# Patient Record
Sex: MALE | Race: WHITE | Employment: OTHER | URBAN - METROPOLITAN AREA
[De-identification: names, ages, dates, MRNs, and addresses within clinical notes are randomized per-mention and may not be internally consistent; named-entity substitution may affect disease eponyms.]

---

## 2017-02-07 ENCOUNTER — TELEPHONE (OUTPATIENT)
Dept: FAMILY MEDICINE CLINIC | Facility: CLINIC | Age: 82
End: 2017-02-07

## 2017-02-07 DIAGNOSIS — E78.00 PURE HYPERCHOLESTEROLEMIA: Primary | ICD-10-CM

## 2017-02-07 NOTE — TELEPHONE ENCOUNTER
Per pt, he needs a refill of his med but would like to know if he needs to have and Order to do, if so pt would like to have his blood work first.

## 2017-02-08 NOTE — TELEPHONE ENCOUNTER
Dr. Geanie Cooks,   see refill encounter as well, pt would like to know if he will need any lab orders done before refills are given. Please advise. Thanks.

## 2017-02-09 RX ORDER — LISINOPRIL 20 MG/1
TABLET ORAL
Qty: 90 TABLET | Refills: 1 | Status: SHIPPED | OUTPATIENT
Start: 2017-02-09 | End: 2017-02-24

## 2017-02-09 RX ORDER — LOVASTATIN 20 MG/1
TABLET ORAL
Qty: 90 TABLET | Refills: 1 | Status: SHIPPED | OUTPATIENT
Start: 2017-02-09 | End: 2017-02-24

## 2017-02-09 RX ORDER — AMLODIPINE BESYLATE 5 MG/1
TABLET ORAL
Qty: 90 TABLET | Refills: 1 | Status: SHIPPED | OUTPATIENT
Start: 2017-02-09 | End: 2017-02-24

## 2017-02-14 ENCOUNTER — APPOINTMENT (OUTPATIENT)
Dept: LAB | Age: 82
End: 2017-02-14
Attending: FAMILY MEDICINE
Payer: MEDICARE

## 2017-02-14 DIAGNOSIS — E78.00 PURE HYPERCHOLESTEROLEMIA: ICD-10-CM

## 2017-02-14 LAB
ALBUMIN SERPL BCP-MCNC: 3.6 G/DL (ref 3.5–4.8)
ALBUMIN/GLOB SERPL: 1.2 {RATIO} (ref 1–2)
ALP SERPL-CCNC: 47 U/L (ref 32–100)
ALT SERPL-CCNC: 14 U/L (ref 17–63)
ANION GAP SERPL CALC-SCNC: 8 MMOL/L (ref 0–18)
AST SERPL-CCNC: 20 U/L (ref 15–41)
BILIRUB SERPL-MCNC: 0.9 MG/DL (ref 0.3–1.2)
BUN SERPL-MCNC: 17 MG/DL (ref 8–20)
BUN/CREAT SERPL: 16.8 (ref 10–20)
CALCIUM SERPL-MCNC: 8.8 MG/DL (ref 8.5–10.5)
CHLORIDE SERPL-SCNC: 101 MMOL/L (ref 95–110)
CHOLEST SERPL-MCNC: 146 MG/DL (ref 110–200)
CO2 SERPL-SCNC: 29 MMOL/L (ref 22–32)
CREAT SERPL-MCNC: 1.01 MG/DL (ref 0.5–1.5)
GLOBULIN PLAS-MCNC: 2.9 G/DL (ref 2.5–3.7)
GLUCOSE SERPL-MCNC: 98 MG/DL (ref 70–99)
HDLC SERPL-MCNC: 44 MG/DL
LDLC SERPL CALC-MCNC: 89 MG/DL (ref 0–99)
NONHDLC SERPL-MCNC: 102 MG/DL
OSMOLALITY UR CALC.SUM OF ELEC: 288 MOSM/KG (ref 275–295)
POTASSIUM SERPL-SCNC: 4.3 MMOL/L (ref 3.3–5.1)
PROT SERPL-MCNC: 6.5 G/DL (ref 5.9–8.4)
SODIUM SERPL-SCNC: 138 MMOL/L (ref 136–144)
TRIGL SERPL-MCNC: 67 MG/DL (ref 1–149)

## 2017-02-14 PROCEDURE — 80061 LIPID PANEL: CPT

## 2017-02-14 PROCEDURE — 36415 COLL VENOUS BLD VENIPUNCTURE: CPT

## 2017-02-14 PROCEDURE — 80053 COMPREHEN METABOLIC PANEL: CPT

## 2017-02-24 ENCOUNTER — OFFICE VISIT (OUTPATIENT)
Dept: FAMILY MEDICINE CLINIC | Facility: CLINIC | Age: 82
End: 2017-02-24

## 2017-02-24 VITALS
BODY MASS INDEX: 25.77 KG/M2 | HEIGHT: 70 IN | SYSTOLIC BLOOD PRESSURE: 103 MMHG | TEMPERATURE: 99 F | WEIGHT: 180 LBS | DIASTOLIC BLOOD PRESSURE: 67 MMHG | HEART RATE: 71 BPM

## 2017-02-24 DIAGNOSIS — R73.03 PREDIABETES: Primary | ICD-10-CM

## 2017-02-24 DIAGNOSIS — E11.9 DIABETES MELLITUS TYPE 2 IN NONOBESE (HCC): ICD-10-CM

## 2017-02-24 DIAGNOSIS — E78.00 PURE HYPERCHOLESTEROLEMIA: ICD-10-CM

## 2017-02-24 PROCEDURE — G0463 HOSPITAL OUTPT CLINIC VISIT: HCPCS | Performed by: FAMILY MEDICINE

## 2017-02-24 PROCEDURE — 99214 OFFICE O/P EST MOD 30 MIN: CPT | Performed by: FAMILY MEDICINE

## 2017-02-24 RX ORDER — LISINOPRIL 20 MG/1
20 TABLET ORAL
Qty: 90 TABLET | Refills: 1 | Status: SHIPPED | OUTPATIENT
Start: 2017-02-24 | End: 2017-08-23

## 2017-02-24 RX ORDER — LOVASTATIN 20 MG/1
TABLET ORAL
Qty: 90 TABLET | Refills: 1 | Status: SHIPPED | OUTPATIENT
Start: 2017-02-24 | End: 2017-08-23

## 2017-02-24 RX ORDER — AMLODIPINE BESYLATE 5 MG/1
5 TABLET ORAL
Qty: 90 TABLET | Refills: 1 | Status: SHIPPED | OUTPATIENT
Start: 2017-02-24 | End: 2017-08-23

## 2017-02-24 NOTE — PROGRESS NOTES
HPI:    Patient ID: Aileen Teague is a 80year old male. HPI Comments: Diabetes diet controlled. Hypertension  This is a chronic problem. The current episode started more than 1 year ago. The problem is controlled.  Pertinent negatives include no apply Kit  Disp:  Rfl:    Glucose Blood (ACCU-CHEK PHILL) In Vitro Strip  Disp:  Rfl:      Allergies:No Known Allergies   PHYSICAL EXAM:   Physical Exam   Constitutional: He is oriented to person, place, and time.  He appears well-developed and well-nourish

## 2017-08-14 ENCOUNTER — TELEPHONE (OUTPATIENT)
Dept: FAMILY MEDICINE CLINIC | Facility: CLINIC | Age: 82
End: 2017-08-14

## 2017-08-14 NOTE — TELEPHONE ENCOUNTER
Pt has labs available from February, Dr Severo Fruit are any other labs needed? Pt has OV 8/23.  Thanks

## 2017-08-15 NOTE — TELEPHONE ENCOUNTER
Pt notified fasting labs available. Said he needs refills prior to appt but will go through pharmacy.

## 2017-08-17 ENCOUNTER — LAB ENCOUNTER (OUTPATIENT)
Dept: LAB | Age: 82
End: 2017-08-17
Attending: FAMILY MEDICINE
Payer: MEDICARE

## 2017-08-17 DIAGNOSIS — E11.9 DIABETES MELLITUS TYPE 2 IN NONOBESE (HCC): ICD-10-CM

## 2017-08-17 DIAGNOSIS — E78.00 PURE HYPERCHOLESTEROLEMIA: ICD-10-CM

## 2017-08-17 LAB
ALBUMIN SERPL BCP-MCNC: 3.7 G/DL (ref 3.5–4.8)
ALBUMIN/GLOB SERPL: 1.3 {RATIO} (ref 1–2)
ALP SERPL-CCNC: 46 U/L (ref 32–100)
ALT SERPL-CCNC: 15 U/L (ref 17–63)
ANION GAP SERPL CALC-SCNC: 6 MMOL/L (ref 0–18)
AST SERPL-CCNC: 19 U/L (ref 15–41)
BASOPHILS # BLD: 0.1 K/UL (ref 0–0.2)
BASOPHILS NFR BLD: 1 %
BILIRUB SERPL-MCNC: 0.6 MG/DL (ref 0.3–1.2)
BUN SERPL-MCNC: 17 MG/DL (ref 8–20)
BUN/CREAT SERPL: 18.1 (ref 10–20)
CALCIUM SERPL-MCNC: 8.7 MG/DL (ref 8.5–10.5)
CHLORIDE SERPL-SCNC: 106 MMOL/L (ref 95–110)
CHOLEST SERPL-MCNC: 149 MG/DL (ref 110–200)
CO2 SERPL-SCNC: 26 MMOL/L (ref 22–32)
CREAT SERPL-MCNC: 0.94 MG/DL (ref 0.5–1.5)
EOSINOPHIL # BLD: 0.1 K/UL (ref 0–0.7)
EOSINOPHIL NFR BLD: 2 %
ERYTHROCYTE [DISTWIDTH] IN BLOOD BY AUTOMATED COUNT: 13.8 % (ref 11–15)
GLOBULIN PLAS-MCNC: 2.8 G/DL (ref 2.5–3.7)
GLUCOSE SERPL-MCNC: 99 MG/DL (ref 70–99)
HBA1C MFR BLD: 6 % (ref 4–6)
HCT VFR BLD AUTO: 42.2 % (ref 41–52)
HDLC SERPL-MCNC: 46 MG/DL
HGB BLD-MCNC: 14.3 G/DL (ref 13.5–17.5)
LDLC SERPL CALC-MCNC: 92 MG/DL (ref 0–99)
LYMPHOCYTES # BLD: 2.1 K/UL (ref 1–4)
LYMPHOCYTES NFR BLD: 31 %
MCH RBC QN AUTO: 30.2 PG (ref 27–32)
MCHC RBC AUTO-ENTMCNC: 33.8 G/DL (ref 32–37)
MCV RBC AUTO: 89.3 FL (ref 80–100)
MONOCYTES # BLD: 0.6 K/UL (ref 0–1)
MONOCYTES NFR BLD: 9 %
NEUTROPHILS # BLD AUTO: 3.9 K/UL (ref 1.8–7.7)
NEUTROPHILS NFR BLD: 58 %
NONHDLC SERPL-MCNC: 103 MG/DL
OSMOLALITY UR CALC.SUM OF ELEC: 288 MOSM/KG (ref 275–295)
PLATELET # BLD AUTO: 139 K/UL (ref 140–400)
PMV BLD AUTO: 10.8 FL (ref 7.4–10.3)
POTASSIUM SERPL-SCNC: 4.2 MMOL/L (ref 3.3–5.1)
PROT SERPL-MCNC: 6.5 G/DL (ref 5.9–8.4)
RBC # BLD AUTO: 4.73 M/UL (ref 4.5–5.9)
SODIUM SERPL-SCNC: 138 MMOL/L (ref 136–144)
TRIGL SERPL-MCNC: 54 MG/DL (ref 1–149)
TSH SERPL-ACNC: 0.8 UIU/ML (ref 0.45–5.33)
WBC # BLD AUTO: 6.8 K/UL (ref 4–11)

## 2017-08-17 PROCEDURE — 36415 COLL VENOUS BLD VENIPUNCTURE: CPT

## 2017-08-17 PROCEDURE — 85025 COMPLETE CBC W/AUTO DIFF WBC: CPT

## 2017-08-17 PROCEDURE — 80053 COMPREHEN METABOLIC PANEL: CPT

## 2017-08-17 PROCEDURE — 83036 HEMOGLOBIN GLYCOSYLATED A1C: CPT

## 2017-08-17 PROCEDURE — 84443 ASSAY THYROID STIM HORMONE: CPT

## 2017-08-17 PROCEDURE — 80061 LIPID PANEL: CPT

## 2017-08-23 ENCOUNTER — OFFICE VISIT (OUTPATIENT)
Dept: FAMILY MEDICINE CLINIC | Facility: CLINIC | Age: 82
End: 2017-08-23

## 2017-08-23 VITALS
WEIGHT: 180 LBS | BODY MASS INDEX: 25.77 KG/M2 | TEMPERATURE: 98 F | SYSTOLIC BLOOD PRESSURE: 107 MMHG | HEART RATE: 80 BPM | HEIGHT: 70 IN | DIASTOLIC BLOOD PRESSURE: 66 MMHG

## 2017-08-23 DIAGNOSIS — E11.9 DIABETES MELLITUS TYPE 2 IN NONOBESE (HCC): Primary | ICD-10-CM

## 2017-08-23 PROCEDURE — G0463 HOSPITAL OUTPT CLINIC VISIT: HCPCS | Performed by: FAMILY MEDICINE

## 2017-08-23 PROCEDURE — 99213 OFFICE O/P EST LOW 20 MIN: CPT | Performed by: FAMILY MEDICINE

## 2017-08-23 RX ORDER — LISINOPRIL 20 MG/1
20 TABLET ORAL
Qty: 90 TABLET | Refills: 1 | Status: SHIPPED | OUTPATIENT
Start: 2017-08-23 | End: 2018-04-12

## 2017-08-23 RX ORDER — LOVASTATIN 20 MG/1
TABLET ORAL
Qty: 90 TABLET | Refills: 1 | Status: SHIPPED | OUTPATIENT
Start: 2017-08-23 | End: 2018-04-05

## 2017-08-23 RX ORDER — ASPIRIN 81 MG/1
81 TABLET ORAL DAILY
Qty: 90 TABLET | Refills: 3 | Status: SHIPPED | OUTPATIENT
Start: 2017-08-23 | End: 2020-04-29

## 2017-08-23 RX ORDER — AMLODIPINE BESYLATE 5 MG/1
5 TABLET ORAL
Qty: 90 TABLET | Refills: 1 | Status: SHIPPED | OUTPATIENT
Start: 2017-08-23 | End: 2018-04-12

## 2017-08-24 NOTE — PROGRESS NOTES
HPI:    Patient ID: Agus Brown is a [de-identified]year old male. Diabetes mellitus controlled. Patient doing well. No problems. Walks daily and is active      Diabetes   Pertinent negatives for diabetes include no chest pain, no fatigue and no weakness. and time. He has normal reflexes. Skin: Skin is warm and dry.               ASSESSMENT/PLAN:   Diabetes mellitus type 2 in nonobese (hcc)  (primary encounter diagnosis)  dieti controlled  Hypertension controlled  cpm  F/u in 6 months  No orders of the def

## 2018-04-02 ENCOUNTER — TELEPHONE (OUTPATIENT)
Dept: FAMILY MEDICINE CLINIC | Facility: CLINIC | Age: 83
End: 2018-04-02

## 2018-04-02 DIAGNOSIS — E78.00 PURE HYPERCHOLESTEROLEMIA: Primary | ICD-10-CM

## 2018-04-02 DIAGNOSIS — E11.9 DIABETES MELLITUS TYPE 2 IN NONOBESE (HCC): ICD-10-CM

## 2018-04-04 NOTE — TELEPHONE ENCOUNTER
Patient informed lab orders placed verbalized understanding. Appointment with Dr. Terra Lopez 4/12/18 confirmed.

## 2018-04-05 ENCOUNTER — APPOINTMENT (OUTPATIENT)
Dept: LAB | Age: 83
End: 2018-04-05
Attending: FAMILY MEDICINE
Payer: MEDICARE

## 2018-04-05 DIAGNOSIS — E11.9 DIABETES MELLITUS TYPE 2 IN NONOBESE (HCC): ICD-10-CM

## 2018-04-05 DIAGNOSIS — E78.00 PURE HYPERCHOLESTEROLEMIA: ICD-10-CM

## 2018-04-05 PROCEDURE — 80061 LIPID PANEL: CPT

## 2018-04-05 PROCEDURE — 80076 HEPATIC FUNCTION PANEL: CPT

## 2018-04-05 PROCEDURE — 83036 HEMOGLOBIN GLYCOSYLATED A1C: CPT

## 2018-04-05 PROCEDURE — 36415 COLL VENOUS BLD VENIPUNCTURE: CPT

## 2018-04-05 RX ORDER — LOVASTATIN 20 MG/1
TABLET ORAL
Qty: 90 TABLET | Refills: 0 | Status: SHIPPED | OUTPATIENT
Start: 2018-04-05 | End: 2018-04-12

## 2018-04-12 ENCOUNTER — OFFICE VISIT (OUTPATIENT)
Dept: FAMILY MEDICINE CLINIC | Facility: CLINIC | Age: 83
End: 2018-04-12

## 2018-04-12 VITALS
HEIGHT: 70 IN | WEIGHT: 176 LBS | TEMPERATURE: 98 F | BODY MASS INDEX: 25.2 KG/M2 | SYSTOLIC BLOOD PRESSURE: 102 MMHG | DIASTOLIC BLOOD PRESSURE: 67 MMHG | HEART RATE: 79 BPM

## 2018-04-12 DIAGNOSIS — E11.9 DIET-CONTROLLED DIABETES MELLITUS (HCC): ICD-10-CM

## 2018-04-12 DIAGNOSIS — Z00.00 ENCOUNTER FOR ANNUAL HEALTH EXAMINATION: ICD-10-CM

## 2018-04-12 DIAGNOSIS — M47.816 LUMBAR SPONDYLOSIS: ICD-10-CM

## 2018-04-12 DIAGNOSIS — H35.30 MACULAR DEGENERATION, UNSPECIFIED LATERALITY, UNSPECIFIED TYPE: Primary | ICD-10-CM

## 2018-04-12 PROCEDURE — G0439 PPPS, SUBSEQ VISIT: HCPCS | Performed by: FAMILY MEDICINE

## 2018-04-12 RX ORDER — LISINOPRIL 20 MG/1
20 TABLET ORAL
Qty: 90 TABLET | Refills: 1 | Status: SHIPPED | OUTPATIENT
Start: 2018-04-12 | End: 2018-11-14

## 2018-04-12 RX ORDER — CHOLECALCIFEROL (VITAMIN D3) 125 MCG
500 CAPSULE ORAL DAILY
Qty: 90 TABLET | Refills: 3 | Status: SHIPPED | OUTPATIENT
Start: 2018-04-12 | End: 2021-04-15

## 2018-04-12 RX ORDER — LOVASTATIN 20 MG/1
TABLET ORAL
Qty: 90 TABLET | Refills: 1 | Status: SHIPPED | OUTPATIENT
Start: 2018-04-12 | End: 2018-11-14

## 2018-04-12 RX ORDER — FOLIC ACID 1 MG/1
1 TABLET ORAL DAILY
Qty: 90 TABLET | Refills: 3 | Status: SHIPPED | OUTPATIENT
Start: 2018-04-12 | End: 2021-04-15

## 2018-04-12 RX ORDER — AMLODIPINE BESYLATE 5 MG/1
5 TABLET ORAL
Qty: 90 TABLET | Refills: 1 | Status: SHIPPED | OUTPATIENT
Start: 2018-04-12 | End: 2018-11-14

## 2018-04-18 ENCOUNTER — NURSE TRIAGE (OUTPATIENT)
Dept: OTHER | Age: 83
End: 2018-04-18

## 2018-04-18 NOTE — TELEPHONE ENCOUNTER
Action Requested: Summary for Provider     []  Critical Lab, Recommendations Needed  [] Need Additional Advice  [x]   FYI    []   Need Orders  [] Need Medications Sent to Pharmacy  []  Other     SUMMARY: sore throat, painful to swallow, occasional cough wi

## 2018-04-19 ENCOUNTER — OFFICE VISIT (OUTPATIENT)
Dept: FAMILY MEDICINE CLINIC | Facility: CLINIC | Age: 83
End: 2018-04-19

## 2018-04-19 VITALS
TEMPERATURE: 98 F | WEIGHT: 176 LBS | BODY MASS INDEX: 25 KG/M2 | DIASTOLIC BLOOD PRESSURE: 72 MMHG | HEART RATE: 68 BPM | SYSTOLIC BLOOD PRESSURE: 104 MMHG

## 2018-04-19 DIAGNOSIS — B34.9 VIRAL SYNDROME: ICD-10-CM

## 2018-04-19 DIAGNOSIS — J02.9 SORE THROAT: Primary | ICD-10-CM

## 2018-04-19 PROCEDURE — G0463 HOSPITAL OUTPT CLINIC VISIT: HCPCS | Performed by: FAMILY MEDICINE

## 2018-04-19 PROCEDURE — 87880 STREP A ASSAY W/OPTIC: CPT | Performed by: FAMILY MEDICINE

## 2018-04-19 PROCEDURE — 99213 OFFICE O/P EST LOW 20 MIN: CPT | Performed by: FAMILY MEDICINE

## 2018-04-19 NOTE — PROGRESS NOTES
HPI:   Celine Koehler is a [de-identified]year old male who presents for a Medicare Subsequent Annual Wellness visit (Pt already had Initial Annual Wellness).     Doing well  His last annual assessment has been over 1 year: Annual Physical due on 06[birth year]05/1937 Physician (Physical Medicine)    Patient Active Problem List:     Diet-controlled diabetes mellitus ([facility] [address]     Macular degeneration     Age-related nuclear cataract of both eyes     Epiretinal membrane, right eye     Lumbar spondylosis: moderate     Lumbar unspecified hyperlipidemia; Rheumatoid arthritis ([facility] [address] [facility] diabetes mellitus without retinopathy [facility] (2014); and Unspecified essential hypertension.     He  has a past surgical history that includes skin surgery (2010) and repair incisional hernia,str background such as a crowded room or restaurant:  No   I get confused about where sounds come from:  No I misunderstand some words in a sentence and need to ask people to repeat themselves:  No   I especially have trouble understanding the speech of women normal, no rashes or lesions   Lymph nodes: Cervical, supraclavicular, and axillary nodes normal   Neurologic: Normal            Vaccination History     Immunization History   Administered Date(s) Administered   • Influenza 10/29/2014   • Influenza Vaccine Internal Lab or Procedure External Lab or Procedure   Diabetes Screening      HbgA1C   Annually GLYCOHEMOGLOBIN (HgA1c) (L) (%)   Date Value   08/04/2016 5.8     Glycohemoglobin (HgA1c) (%)   Date Value   04/05/2018 6.1 (H)       No flowsheet data found. the same house as a HepB virus carrier   Homosexual men   Illicit injectable drug abusers     Tetanus Toxoid  Only covered with a cut with metal- TD and TDaP Not covered by Medicare Part B) No vaccine history found This may be covered with your prescriptio

## 2018-04-19 NOTE — PATIENT INSTRUCTIONS
Ivonen Martin's SCREENING SCHEDULE   Tests on this list are recommended by your physician but may not be covered, or covered at this frequency, by your insurer. Please check with your insurance carrier before scheduling to verify coverage.     PREVENT 73-68 years old and have smoked more than 100 cigarettes in their lifetime   • Anyone with a family history    Colorectal Cancer Screening Covered up to Age 76     Colonoscopy Screen   Covered every 10 years- more often if abnormal There are no preventive the same house as a HepB virus carrier   Homosexual men   Illicit injectable drug abusers     Tetanus Toxoid- Only covered with a cut with metal- TD and TDaP Not covered by Medicare Part B) No orders found for this or any previous visit.  This may be covere

## 2018-04-20 ENCOUNTER — TELEPHONE (OUTPATIENT)
Dept: OTHER | Age: 83
End: 2018-04-20

## 2018-04-26 NOTE — PROGRESS NOTES
HPI:    Patient ID: Little Lynn is a [de-identified]year old male. Sore throat. Feeling weak, has body aches. Slightly better then yesterday. Chills. Slight fever        Review of Systems   Constitutional: Positive for chills, fatigue and fever.    HENT: Posit normal. There is no tenderness. Lymphadenopathy:     He has no cervical adenopathy. Neurological: He is alert and oriented to person, place, and time. He has normal reflexes. Skin: Skin is warm and dry.               ASSESSMENT/PLAN:   Sore throat  (p

## 2018-10-24 ENCOUNTER — TELEPHONE (OUTPATIENT)
Dept: INTERNAL MEDICINE CLINIC | Facility: CLINIC | Age: 83
End: 2018-10-24

## 2018-10-24 DIAGNOSIS — E11.9 DIABETES MELLITUS TYPE 2 IN NONOBESE (HCC): ICD-10-CM

## 2018-10-24 DIAGNOSIS — E78.00 PURE HYPERCHOLESTEROLEMIA: Primary | ICD-10-CM

## 2018-10-24 NOTE — TELEPHONE ENCOUNTER
Pt is calling per Kathryn he need to have a f/u apt CHOL   Pt will make an apt after labs are orderd   Please advise

## 2018-10-25 ENCOUNTER — LAB ENCOUNTER (OUTPATIENT)
Dept: LAB | Age: 83
End: 2018-10-25
Attending: FAMILY MEDICINE
Payer: MEDICARE

## 2018-10-25 ENCOUNTER — OFFICE VISIT (OUTPATIENT)
Dept: OPHTHALMOLOGY | Facility: CLINIC | Age: 83
End: 2018-10-25
Payer: MEDICARE

## 2018-10-25 DIAGNOSIS — H43.393 FLOATER, VITREOUS, BILATERAL: ICD-10-CM

## 2018-10-25 DIAGNOSIS — E11.9 DIABETES MELLITUS TYPE 2 IN NONOBESE (HCC): ICD-10-CM

## 2018-10-25 DIAGNOSIS — H35.30 MACULAR DEGENERATION, UNSPECIFIED LATERALITY, UNSPECIFIED TYPE: ICD-10-CM

## 2018-10-25 DIAGNOSIS — E11.9 DIET-CONTROLLED DIABETES MELLITUS (HCC): Primary | ICD-10-CM

## 2018-10-25 DIAGNOSIS — E78.00 PURE HYPERCHOLESTEROLEMIA: ICD-10-CM

## 2018-10-25 DIAGNOSIS — H25.13 AGE-RELATED NUCLEAR CATARACT OF BOTH EYES: ICD-10-CM

## 2018-10-25 PROCEDURE — 84443 ASSAY THYROID STIM HORMONE: CPT

## 2018-10-25 PROCEDURE — 36415 COLL VENOUS BLD VENIPUNCTURE: CPT

## 2018-10-25 PROCEDURE — 80061 LIPID PANEL: CPT

## 2018-10-25 PROCEDURE — 80053 COMPREHEN METABOLIC PANEL: CPT

## 2018-10-25 PROCEDURE — 85025 COMPLETE CBC W/AUTO DIFF WBC: CPT

## 2018-10-25 PROCEDURE — 83036 HEMOGLOBIN GLYCOSYLATED A1C: CPT

## 2018-10-25 PROCEDURE — 92014 COMPRE OPH EXAM EST PT 1/>: CPT | Performed by: OPHTHALMOLOGY

## 2018-10-25 PROCEDURE — 92015 DETERMINE REFRACTIVE STATE: CPT | Performed by: OPHTHALMOLOGY

## 2018-10-25 NOTE — PATIENT INSTRUCTIONS
Age-related nuclear cataract of both eyes  Discussed early cataracts with patient. No treatment recommended at this time. New glasses today; update as needed. Discussed that new prescription is only a slight change.        Diet-controlled diabetes [place]

## 2018-10-25 NOTE — ASSESSMENT & PLAN NOTE
Suggest starting a multivitamin daily, eat a healthy diet, use UV sunglasses outside and avoid smoke exposure.

## 2018-10-25 NOTE — PROGRESS NOTES
Aileen Teague is a 82year old male.     HPI:     HPI     Diabetic Eye Exam      Additional comments: Pt has been a PREdiabetic for 8+ years( diet controlled)  0 years on pills/  0 years on Insulin   Pt checks his BS 2x a week   Pt's last blood sugar wa EVERY DAY WITH EVENING MEAL Disp: 90 tablet Rfl: 1   folic acid 1 MG Oral Tab Take 1 tablet (1 mg total) by mouth daily. Disp: 90 tablet Rfl: 3   Vitamin B-12 500 MCG Oral Tab Take 1 tablet (500 mcg total) by mouth daily.  Disp: 90 tablet Rfl: 3   aspirin ( +2. 50    Type:  Flat top bifocal          Manifest Refraction       Sphere Cylinder [place]    Right +1.50 +0.75 165 20/25- +3.00 20/20    Left +1.25 +0.75 180 20/30+ +3.00 20/20          Final Rx       Sphere Cylinder [place] Add [place]

## 2018-10-31 ENCOUNTER — OFFICE VISIT (OUTPATIENT)
Dept: FAMILY MEDICINE CLINIC | Facility: CLINIC | Age: 83
End: 2018-10-31
Payer: MEDICARE

## 2018-10-31 ENCOUNTER — TELEPHONE (OUTPATIENT)
Dept: FAMILY MEDICINE CLINIC | Facility: CLINIC | Age: 83
End: 2018-10-31

## 2018-10-31 VITALS
BODY MASS INDEX: 26.05 KG/M2 | WEIGHT: 182 LBS | SYSTOLIC BLOOD PRESSURE: 128 MMHG | DIASTOLIC BLOOD PRESSURE: 73 MMHG | RESPIRATION RATE: 16 BRPM | HEIGHT: 70 IN | TEMPERATURE: 98 F | HEART RATE: 77 BPM

## 2018-10-31 DIAGNOSIS — E11.9 DIABETES MELLITUS TYPE 2 IN NONOBESE (HCC): ICD-10-CM

## 2018-10-31 DIAGNOSIS — I10 ESSENTIAL HYPERTENSION: ICD-10-CM

## 2018-10-31 DIAGNOSIS — K63.5 POLYP OF COLON, UNSPECIFIED PART OF COLON, UNSPECIFIED TYPE: Primary | ICD-10-CM

## 2018-10-31 PROCEDURE — 90653 IIV ADJUVANT VACCINE IM: CPT | Performed by: FAMILY MEDICINE

## 2018-10-31 PROCEDURE — G0008 ADMIN INFLUENZA VIRUS VAC: HCPCS | Performed by: FAMILY MEDICINE

## 2018-10-31 PROCEDURE — G0463 HOSPITAL OUTPT CLINIC VISIT: HCPCS | Performed by: FAMILY MEDICINE

## 2018-10-31 PROCEDURE — 99214 OFFICE O/P EST MOD 30 MIN: CPT | Performed by: FAMILY MEDICINE

## 2018-10-31 NOTE — PROGRESS NOTES
HPI:    Patient ID: Chrsita Loza is a 82year old male. Doing well. Here for f/u diabetes and hypertension        Review of Systems   Constitutional: Negative. Negative for activity change, appetite change, chills and diaphoresis.    HENT: Negative is no tenderness. There is no rebound.               ASSESSMENT/PLAN:   Polyp of colon, unspecified part of colon, unspecified type  (primary encounter diagnosis)  (K63.5) Polyp of colon, unspecified part of colon, unspecified type  (primary encounter diagn

## 2018-11-14 RX ORDER — LISINOPRIL 20 MG/1
TABLET ORAL
Qty: 90 TABLET | Refills: 0 | Status: SHIPPED | OUTPATIENT
Start: 2018-11-14 | End: 2019-02-12

## 2018-11-14 RX ORDER — AMLODIPINE BESYLATE 5 MG/1
TABLET ORAL
Qty: 90 TABLET | Refills: 0 | Status: SHIPPED | OUTPATIENT
Start: 2018-11-14 | End: 2019-02-12

## 2018-11-14 RX ORDER — LOVASTATIN 20 MG/1
TABLET ORAL
Qty: 90 TABLET | Refills: 0 | Status: SHIPPED | OUTPATIENT
Start: 2018-11-14 | End: 2019-04-23

## 2019-02-12 RX ORDER — AMLODIPINE BESYLATE 5 MG/1
TABLET ORAL
Qty: 90 TABLET | Refills: 0 | Status: SHIPPED | OUTPATIENT
Start: 2019-02-12 | End: 2019-05-17

## 2019-02-12 RX ORDER — LISINOPRIL 20 MG/1
TABLET ORAL
Qty: 90 TABLET | Refills: 0 | Status: SHIPPED | OUTPATIENT
Start: 2019-02-12 | End: 2019-05-17

## 2019-04-23 RX ORDER — LOVASTATIN 20 MG/1
TABLET ORAL
Qty: 90 TABLET | Refills: 0 | Status: SHIPPED | OUTPATIENT
Start: 2019-04-23 | End: 2019-05-17

## 2019-05-06 ENCOUNTER — TELEPHONE (OUTPATIENT)
Dept: FAMILY MEDICINE CLINIC | Facility: CLINIC | Age: 84
End: 2019-05-06

## 2019-05-06 DIAGNOSIS — R73.03 PREDIABETES: Primary | ICD-10-CM

## 2019-05-13 ENCOUNTER — LAB ENCOUNTER (OUTPATIENT)
Dept: LAB | Age: 84
End: 2019-05-13
Attending: FAMILY MEDICINE
Payer: MEDICARE

## 2019-05-13 DIAGNOSIS — R73.03 PREDIABETES: ICD-10-CM

## 2019-05-13 PROCEDURE — 82607 VITAMIN B-12: CPT

## 2019-05-13 PROCEDURE — 80076 HEPATIC FUNCTION PANEL: CPT

## 2019-05-13 PROCEDURE — 83036 HEMOGLOBIN GLYCOSYLATED A1C: CPT

## 2019-05-13 PROCEDURE — 36415 COLL VENOUS BLD VENIPUNCTURE: CPT

## 2019-05-17 ENCOUNTER — OFFICE VISIT (OUTPATIENT)
Dept: FAMILY MEDICINE CLINIC | Facility: CLINIC | Age: 84
End: 2019-05-17
Payer: MEDICARE

## 2019-05-17 VITALS
SYSTOLIC BLOOD PRESSURE: 101 MMHG | TEMPERATURE: 98 F | DIASTOLIC BLOOD PRESSURE: 66 MMHG | WEIGHT: 181 LBS | BODY MASS INDEX: 26.21 KG/M2 | HEART RATE: 80 BPM | HEIGHT: 69.5 IN

## 2019-05-17 DIAGNOSIS — E11.9 DIET-CONTROLLED DIABETES MELLITUS (HCC): Primary | ICD-10-CM

## 2019-05-17 DIAGNOSIS — H35.30 MACULAR DEGENERATION, UNSPECIFIED LATERALITY, UNSPECIFIED TYPE: ICD-10-CM

## 2019-05-17 DIAGNOSIS — H53.9 VISION DISORDER: ICD-10-CM

## 2019-05-17 DIAGNOSIS — E11.9 DIABETES MELLITUS TYPE 2 IN NONOBESE (HCC): ICD-10-CM

## 2019-05-17 DIAGNOSIS — Z00.00 ENCOUNTER FOR ANNUAL HEALTH EXAMINATION: ICD-10-CM

## 2019-05-17 DIAGNOSIS — R42 DIZZINESS: ICD-10-CM

## 2019-05-17 PROCEDURE — G0439 PPPS, SUBSEQ VISIT: HCPCS | Performed by: FAMILY MEDICINE

## 2019-05-17 RX ORDER — LOVASTATIN 20 MG/1
TABLET ORAL
Qty: 90 TABLET | Refills: 1 | Status: SHIPPED | OUTPATIENT
Start: 2019-05-17 | End: 2019-11-08

## 2019-05-17 RX ORDER — LISINOPRIL 20 MG/1
20 TABLET ORAL
Qty: 90 TABLET | Refills: 1 | Status: SHIPPED | OUTPATIENT
Start: 2019-05-17 | End: 2019-11-08

## 2019-05-17 RX ORDER — AMLODIPINE BESYLATE 5 MG/1
5 TABLET ORAL
Qty: 90 TABLET | Refills: 1 | Status: SHIPPED | OUTPATIENT
Start: 2019-05-17 | End: 2019-11-08

## 2019-05-23 NOTE — PROGRESS NOTES
HPI:   Gianna Pratt is a 82year old male who presents for a Medicare Subsequent Annual Wellness visit (Pt already had Initial Annual Wellness).     Doing well  His last annual assessment has been over 1 year: Annual Physical due on 04/12/2019 vitreous, bilateral     Lumbar spondylosis: moderate     Lumbar degenerative disc disease: moderate    Wt Readings from Last 3 Encounters:  05/17/19 : 181 lb (82.1 kg)  10/31/18 : 182 lb (82.6 kg)  04/19/18 : 176 lb (79.8 kg)     Last Cholesterol Labs:   L that includes skin surgery (2010) and repair incisional hernia,mariella (5/16/15). His family history includes Cancer in his mother; Prostate Cancer in his father. SOCIAL HISTORY:   He  reports that he has never smoked.  He has never used smokeless tobac No   I especially have trouble understanding the speech of women and children:  No I have trouble understanding the speaker in a large room such as at a meeting or place of Caodaism:  No   Many people I talk to seem to mumble (or don't speak clearly):   No P Administered   • FLUAD High Dose 72 yr and older (80634) 10/31/2018   • Influenza 10/29/2014   • Influenza Vaccine, Preserv Free 09/09/2010, 10/29/2013   • Pneumovax 23 11/09/2010   • Zoster Vaccine Live (Zostavax) 12/31/2010        ASSESSMENT AND OTHER RE 05/13/2019 6.3 (H)       No flowsheet data found.     Fasting Blood Sugar (FSB)Annually Glucose (mg/dL)   Date Value   10/25/2018 101 (H)     GLUCOSE (P) (mg/dL)   Date Value   08/04/2016 97       Cardiovascular Disease Screening     LDL Annually LDL Chol found This may be covered with your prescription benefits, but Medicare does not cover unless Medically needed    Zoster   Not covered by Medicare Part B No vaccine history found This may be covered with your pharmacy  prescription benefits      SPECIFIC D

## 2019-05-23 NOTE — PATIENT INSTRUCTIONS
Ivonne Martin's SCREENING SCHEDULE   Tests on this list are recommended by your physician but may not be covered, or covered at this frequency, by your insurer. Please check with your insurance carrier before scheduling to verify coverage.     PREVENT family history    Colorectal Cancer Screening Covered up to Age 76     Colonoscopy Screen   Covered every 10 years- more often if abnormal There are no preventive care reminders to display for this patient.  Update Catarizm if applicable    Flex S Tetanus Toxoid- Only covered with a cut with metal- TD and TDaP Not covered by Medicare Part B) No orders found for this or any previous visit.  This may be covered with your prescription benefits, but Medicare does not cover unless Medically needed

## 2019-11-01 ENCOUNTER — TELEPHONE (OUTPATIENT)
Dept: FAMILY MEDICINE CLINIC | Facility: CLINIC | Age: 84
End: 2019-11-01

## 2019-11-01 DIAGNOSIS — E11.9 DIABETES MELLITUS TYPE 2 IN NONOBESE (HCC): Primary | ICD-10-CM

## 2019-11-04 ENCOUNTER — LAB ENCOUNTER (OUTPATIENT)
Dept: LAB | Age: 84
End: 2019-11-04
Attending: FAMILY MEDICINE
Payer: MEDICARE

## 2019-11-04 DIAGNOSIS — E11.9 DIABETES MELLITUS TYPE 2 IN NONOBESE (HCC): ICD-10-CM

## 2019-11-04 PROCEDURE — 80053 COMPREHEN METABOLIC PANEL: CPT

## 2019-11-04 PROCEDURE — 80061 LIPID PANEL: CPT

## 2019-11-04 PROCEDURE — 85025 COMPLETE CBC W/AUTO DIFF WBC: CPT

## 2019-11-04 PROCEDURE — 83036 HEMOGLOBIN GLYCOSYLATED A1C: CPT

## 2019-11-04 PROCEDURE — 36415 COLL VENOUS BLD VENIPUNCTURE: CPT

## 2019-11-08 ENCOUNTER — OFFICE VISIT (OUTPATIENT)
Dept: FAMILY MEDICINE CLINIC | Facility: CLINIC | Age: 84
End: 2019-11-08
Payer: MEDICARE

## 2019-11-08 VITALS
HEIGHT: 69.5 IN | TEMPERATURE: 98 F | BODY MASS INDEX: 25.89 KG/M2 | HEART RATE: 82 BPM | DIASTOLIC BLOOD PRESSURE: 70 MMHG | WEIGHT: 178.81 LBS | SYSTOLIC BLOOD PRESSURE: 108 MMHG

## 2019-11-08 DIAGNOSIS — E11.9 DIABETES MELLITUS TYPE 2 IN NONOBESE (HCC): Primary | ICD-10-CM

## 2019-11-08 PROCEDURE — 99213 OFFICE O/P EST LOW 20 MIN: CPT | Performed by: FAMILY MEDICINE

## 2019-11-08 PROCEDURE — G0463 HOSPITAL OUTPT CLINIC VISIT: HCPCS | Performed by: FAMILY MEDICINE

## 2019-11-08 RX ORDER — LISINOPRIL 20 MG/1
20 TABLET ORAL
Qty: 90 TABLET | Refills: 1 | Status: SHIPPED | OUTPATIENT
Start: 2019-11-08 | End: 2020-04-28

## 2019-11-08 RX ORDER — LOVASTATIN 20 MG/1
TABLET ORAL
Qty: 90 TABLET | Refills: 1 | Status: SHIPPED | OUTPATIENT
Start: 2019-11-08 | End: 2020-07-30

## 2019-11-08 RX ORDER — AMLODIPINE BESYLATE 5 MG/1
5 TABLET ORAL
Qty: 90 TABLET | Refills: 1 | Status: SHIPPED | OUTPATIENT
Start: 2019-11-08 | End: 2020-04-28

## 2019-11-08 NOTE — PROGRESS NOTES
HPI:    Patient ID: Karely Mcmahon is a 82year old male. Patient here for f/u diabetes. And hypertension. Doing well no complaints      Review of Systems   Constitutional: Negative. Negative for appetite change, chills, diaphoresis and fatigue.    H He exhibits no distension. There is no tenderness. There is no rebound. ASSESSMENT/PLAN:   Diabetes mellitus type 2 in nonobese (hcc)  (primary encounter diagnosis)  Doing well.  F/u in 6 months  cpm  No orders of the defined types were placed

## 2020-01-30 ENCOUNTER — OFFICE VISIT (OUTPATIENT)
Dept: OPHTHALMOLOGY | Facility: CLINIC | Age: 85
End: 2020-01-30
Payer: MEDICARE

## 2020-01-30 DIAGNOSIS — H25.13 AGE-RELATED NUCLEAR CATARACT OF BOTH EYES: ICD-10-CM

## 2020-01-30 DIAGNOSIS — H43.393 FLOATER, VITREOUS, BILATERAL: ICD-10-CM

## 2020-01-30 DIAGNOSIS — H35.30 MACULAR DEGENERATION, UNSPECIFIED LATERALITY, UNSPECIFIED TYPE: ICD-10-CM

## 2020-01-30 DIAGNOSIS — E11.9 DIET-CONTROLLED DIABETES MELLITUS (HCC): Primary | ICD-10-CM

## 2020-01-30 PROCEDURE — 92015 DETERMINE REFRACTIVE STATE: CPT | Performed by: OPHTHALMOLOGY

## 2020-01-30 PROCEDURE — 92014 COMPRE OPH EXAM EST PT 1/>: CPT | Performed by: OPHTHALMOLOGY

## 2020-01-30 NOTE — ASSESSMENT & PLAN NOTE
Diet controlled diabetesI: no background of retinopathy, no signs of neovascularization noted. Discussed ocular and systemic benefits of blood sugar control.

## 2020-01-30 NOTE — PATIENT INSTRUCTIONS
Diet-controlled diabetes mellitus ([facility] [address]  Diet controlled diabetesI: no background of retinopathy, no signs of neovascularization noted. Discussed ocular and systemic benefits of blood sugar control.       Age-related nuclear cataract of both eyes  Discusse

## 2020-01-30 NOTE — ASSESSMENT & PLAN NOTE
Discussed advancing cataracts with patient. Discussed that cataracts are bad enough to consider surgery, but it would be patient's choice. Options:  1.) Continue same glasses.   2.) Update glasses, but discussed with patient that new glasses only improv

## 2020-01-30 NOTE — PROGRESS NOTES
Autumn Gonsales is a 82year old male.     HPI:     HPI     Diabetic Eye Exam      Additional comments: Pt is PRE diabetic    Pt's diabetes is currently controlled by diet  Pt checks BS 2x a week   Pt's last blood sugar was 110  Last HA1C was 6.3 on 11/4/ (5 mg total) by mouth once daily. 90 tablet 1   • folic acid 1 MG Oral Tab Take 1 tablet (1 mg total) by mouth daily. 90 tablet 3   • Vitamin B-12 500 MCG Oral Tab Take 1 tablet (500 mcg total) by mouth daily.  90 tablet 3   • aspirin (ECOTRIN LOW STRENGTH) Periphery Normal Normal            Refraction     Wearing Rx       Sphere Cylinder [place] Add    Right +1.50 +0.75 170 +2.50    Left +1.25 +0.25 069 +2.50    Type:  Flat top bifocal          Manifest Refraction       Sphere Cylinder [place] Dist [place]

## 2020-04-01 ENCOUNTER — APPOINTMENT (OUTPATIENT)
Dept: GENERAL RADIOLOGY | Facility: HOSPITAL | Age: 85
DRG: 066 | End: 2020-04-01
Attending: EMERGENCY MEDICINE
Payer: MEDICARE

## 2020-04-01 ENCOUNTER — NURSE TRIAGE (OUTPATIENT)
Dept: FAMILY MEDICINE CLINIC | Facility: CLINIC | Age: 85
End: 2020-04-01

## 2020-04-01 ENCOUNTER — HOSPITAL ENCOUNTER (INPATIENT)
Facility: HOSPITAL | Age: 85
LOS: 1 days | Discharge: HOME | DRG: 066 | End: 2020-04-02
Attending: EMERGENCY MEDICINE | Admitting: HOSPITALIST
Payer: MEDICARE

## 2020-04-01 ENCOUNTER — APPOINTMENT (OUTPATIENT)
Dept: CT IMAGING | Facility: HOSPITAL | Age: 85
DRG: 066 | End: 2020-04-01
Attending: EMERGENCY MEDICINE
Payer: MEDICARE

## 2020-04-01 ENCOUNTER — APPOINTMENT (OUTPATIENT)
Dept: ULTRASOUND IMAGING | Facility: HOSPITAL | Age: 85
DRG: 066 | End: 2020-04-01
Attending: Other
Payer: MEDICARE

## 2020-04-01 DIAGNOSIS — I63.9 CEREBROVASCULAR ACCIDENT (CVA), UNSPECIFIED MECHANISM (HCC): ICD-10-CM

## 2020-04-01 DIAGNOSIS — I63.9 CEREBROVASCULAR ACCIDENT (CVA), UNSPECIFIED MECHANISM (HCC): Primary | ICD-10-CM

## 2020-04-01 PROCEDURE — 99223 1ST HOSP IP/OBS HIGH 75: CPT | Performed by: HOSPITALIST

## 2020-04-01 PROCEDURE — G2012 BRIEF CHECK IN BY MD/QHP: HCPCS | Performed by: FAMILY MEDICINE

## 2020-04-01 PROCEDURE — 93880 EXTRACRANIAL BILAT STUDY: CPT | Performed by: OTHER

## 2020-04-01 PROCEDURE — 70450 CT HEAD/BRAIN W/O DYE: CPT | Performed by: EMERGENCY MEDICINE

## 2020-04-01 PROCEDURE — 71045 X-RAY EXAM CHEST 1 VIEW: CPT | Performed by: EMERGENCY MEDICINE

## 2020-04-01 RX ORDER — SODIUM CHLORIDE 9 MG/ML
INJECTION, SOLUTION INTRAVENOUS CONTINUOUS
Status: DISCONTINUED | OUTPATIENT
Start: 2020-04-01 | End: 2020-04-02

## 2020-04-01 RX ORDER — TEMAZEPAM 7.5 MG/1
7.5 CAPSULE ORAL NIGHTLY PRN
Status: DISCONTINUED | OUTPATIENT
Start: 2020-04-01 | End: 2020-04-02

## 2020-04-01 RX ORDER — ASPIRIN 81 MG/1
81 TABLET, CHEWABLE ORAL ONCE
Status: COMPLETED | OUTPATIENT
Start: 2020-04-01 | End: 2020-04-01

## 2020-04-01 RX ORDER — ACETAMINOPHEN 325 MG/1
650 TABLET ORAL EVERY 6 HOURS PRN
Status: DISCONTINUED | OUTPATIENT
Start: 2020-04-01 | End: 2020-04-01

## 2020-04-01 RX ORDER — FOLIC ACID 1 MG/1
1 TABLET ORAL DAILY
Status: DISCONTINUED | OUTPATIENT
Start: 2020-04-02 | End: 2020-04-02

## 2020-04-01 RX ORDER — LISINOPRIL 20 MG/1
20 TABLET ORAL
Status: DISCONTINUED | OUTPATIENT
Start: 2020-04-02 | End: 2020-04-02

## 2020-04-01 RX ORDER — ACETAMINOPHEN 650 MG/1
650 SUPPOSITORY RECTAL EVERY 4 HOURS PRN
Status: DISCONTINUED | OUTPATIENT
Start: 2020-04-01 | End: 2020-04-02

## 2020-04-01 RX ORDER — SENNOSIDES 8.6 MG
17.2 TABLET ORAL NIGHTLY
Status: DISCONTINUED | OUTPATIENT
Start: 2020-04-01 | End: 2020-04-02

## 2020-04-01 RX ORDER — SODIUM CHLORIDE 0.9 % (FLUSH) 0.9 %
3 SYRINGE (ML) INJECTION AS NEEDED
Status: DISCONTINUED | OUTPATIENT
Start: 2020-04-01 | End: 2020-04-02

## 2020-04-01 RX ORDER — CLOPIDOGREL BISULFATE 75 MG/1
75 TABLET ORAL DAILY
Status: DISCONTINUED | OUTPATIENT
Start: 2020-04-01 | End: 2020-04-02

## 2020-04-01 RX ORDER — ASPIRIN 81 MG/1
81 TABLET, CHEWABLE ORAL DAILY
Status: DISCONTINUED | OUTPATIENT
Start: 2020-04-01 | End: 2020-04-02

## 2020-04-01 RX ORDER — AMLODIPINE BESYLATE 5 MG/1
5 TABLET ORAL
Status: DISCONTINUED | OUTPATIENT
Start: 2020-04-02 | End: 2020-04-02

## 2020-04-01 RX ORDER — ACETAMINOPHEN 325 MG/1
650 TABLET ORAL EVERY 4 HOURS PRN
Status: DISCONTINUED | OUTPATIENT
Start: 2020-04-01 | End: 2020-04-02

## 2020-04-01 RX ORDER — PRAVASTATIN SODIUM 20 MG
20 TABLET ORAL NIGHTLY
Status: DISCONTINUED | OUTPATIENT
Start: 2020-04-01 | End: 2020-04-02

## 2020-04-01 RX ORDER — LABETALOL HYDROCHLORIDE 5 MG/ML
10 INJECTION, SOLUTION INTRAVENOUS EVERY 10 MIN PRN
Status: DISCONTINUED | OUTPATIENT
Start: 2020-04-01 | End: 2020-04-02

## 2020-04-01 RX ORDER — ONDANSETRON 2 MG/ML
4 INJECTION INTRAMUSCULAR; INTRAVENOUS EVERY 6 HOURS PRN
Status: DISCONTINUED | OUTPATIENT
Start: 2020-04-01 | End: 2020-04-02

## 2020-04-01 RX ORDER — DEXTROSE MONOHYDRATE 25 G/50ML
50 INJECTION, SOLUTION INTRAVENOUS
Status: DISCONTINUED | OUTPATIENT
Start: 2020-04-01 | End: 2020-04-02

## 2020-04-01 RX ORDER — HEPARIN SODIUM 5000 [USP'U]/ML
5000 INJECTION, SOLUTION INTRAVENOUS; SUBCUTANEOUS EVERY 12 HOURS SCHEDULED
Status: DISCONTINUED | OUTPATIENT
Start: 2020-04-01 | End: 2020-04-02

## 2020-04-01 NOTE — PROGRESS NOTES
Responded to Stroke Alert and provided emotional support to patient's daughter as she waited for her father to return from [place]. No further spiritual care needed at this time.        04/01/20 7515   Clinical Encounter Type   Visited With Patient and family to

## 2020-04-01 NOTE — TELEPHONE ENCOUNTER
Please reply to pool: EM RN TRIAGE    Action Requested: Summary for Provider     []  Critical Lab, Recommendations Needed  [x] Need Additional Advice  []   FYI    [x]   Need Orders  [] Need Medications Sent to Pharmacy  []  Other     SUMMARY: Declines ED

## 2020-04-01 NOTE — ED INITIAL ASSESSMENT (HPI)
States numbness/weaknees to left arm since 1400, no facial droop, no slurred speech, weakness noted to lt hand with hand grasp.

## 2020-04-01 NOTE — ED PROVIDER NOTES
Patient Seen in: [facility] AND [facility] Emergency Department      History   Patient presents with:  Numbness Weakness    Stated Complaint: left hand weakness    HPI    26-year-old male who occasionally takes a baby aspirin sent by his doctor's office today af Device None (Room air)       Current:/90   Pulse 78   Temp 98.3 °F (36.8 °C) (Oral)   Resp 14   SpO2 97%         Physical Exam    Constitutional: Oriented to person, place, and time. Appears well-developed. No distress.    Head: Normocephalic and atr The following orders were created for panel order CBC WITH DIFFERENTIAL WITH PLATELET.   Procedure                               Abnormality         Status                     ---------                               -----------         ------ which includes the Dose Index Registry. FINDINGS:  CSF SPACES: Ventricles, cisterns, and sulci are appropriate for age. No hydrocephalus, subarachnoid hemorrhage, or mass. No midline shift.   CEREBRUM: Age-appropriate atrophy is present, without visible you schedule follow up care with a primary care provider within the next three months to obtain basic health screening including reassessment of your blood pressure.     Medications Prescribed:  Current Discharge Medication List                   Present on

## 2020-04-01 NOTE — TELEPHONE ENCOUNTER
Virtual/Telephone Check-In    Little Lynn verbally consents to a Virtual/Telephone Check-In service on 04/01/20. Patient understands and accepts financial responsibility for any deductible, co-insurance and/or co-pays associated with this service.

## 2020-04-02 ENCOUNTER — APPOINTMENT (OUTPATIENT)
Dept: CV DIAGNOSTICS | Facility: HOSPITAL | Age: 85
DRG: 066 | End: 2020-04-02
Attending: Other
Payer: MEDICARE

## 2020-04-02 ENCOUNTER — APPOINTMENT (OUTPATIENT)
Dept: MRI IMAGING | Facility: HOSPITAL | Age: 85
DRG: 066 | End: 2020-04-02
Attending: Other
Payer: MEDICARE

## 2020-04-02 VITALS
TEMPERATURE: 98 F | DIASTOLIC BLOOD PRESSURE: 73 MMHG | HEIGHT: 72 IN | OXYGEN SATURATION: 97 % | WEIGHT: 175.31 LBS | HEART RATE: 73 BPM | RESPIRATION RATE: 16 BRPM | SYSTOLIC BLOOD PRESSURE: 111 MMHG | BODY MASS INDEX: 23.75 KG/M2

## 2020-04-02 PROCEDURE — 93306 TTE W/DOPPLER COMPLETE: CPT | Performed by: OTHER

## 2020-04-02 PROCEDURE — 99223 1ST HOSP IP/OBS HIGH 75: CPT | Performed by: OTHER

## 2020-04-02 PROCEDURE — 70551 MRI BRAIN STEM W/O DYE: CPT | Performed by: OTHER

## 2020-04-02 PROCEDURE — 99239 HOSP IP/OBS DSCHRG MGMT >30: CPT | Performed by: HOSPITALIST

## 2020-04-02 RX ORDER — POTASSIUM CHLORIDE 20 MEQ/1
40 TABLET, EXTENDED RELEASE ORAL ONCE
Status: COMPLETED | OUTPATIENT
Start: 2020-04-02 | End: 2020-04-02

## 2020-04-02 RX ORDER — CLOPIDOGREL BISULFATE 75 MG/1
75 TABLET ORAL DAILY
Qty: 30 TABLET | Refills: 0 | Status: SHIPPED | OUTPATIENT
Start: 2020-04-03 | End: 2020-04-29

## 2020-04-02 NOTE — DISCHARGE SUMMARY
[facility]    Discharge Summary    [facility] Patient Status:  Inpatient    1935 MRN [id number]   Location [facility] 3W/SW Attending Irving Huang MD   Hosp Day # 1 PCP Bridget Whatley MD     Date of Admission:  4 weeks   - cardiac monitoring in he future at least 30 days   - echo reviewed- EF 55-60%    HTN  -stable     DM2  - diet controlled    Complications: none    Consultants     Provider Role Specialty    Naima Dumas MD Consulting Physician  Uma Zamora aspirin 81 MG Tbec  Commonly known as:  Ecotrin Low Strength      Take 1 tablet (81 mg total) by mouth daily. Quantity:  90 tablet  Refills:  3     folic acid 1 MG Tabs  Commonly known as:  FOLVITE      Take 1 tablet (1 mg total) by mouth daily.    Maykel Barrett

## 2020-04-02 NOTE — H&P
[facility]    PATIENT'S NAME: Lit Moran   ATTENDING PHYSICIAN: Bryce Molina MD   PATIENT ACCOUNT#:   [id number]    LOCATION:  [place] [address]  MEDICAL RECORD #:   [id number]       YOB: 1935  ADMISSION DATE:       04/01 improving and he is almost back to his baseline. No diplopia, no paresthesia, no chest pain or shortness of breath. No muscle weakness in other upper or lower extremities. Other 12-point review of systems negative.       PHYSICAL EXAMINATION:    GENERAL:

## 2020-04-02 NOTE — SLP NOTE
ADULT SWALLOWING EVALUATION    ASSESSMENT    ASSESSMENT/OVERALL IMPRESSION:    This BSE was ordered d/t stroke protocol. Pt alert, on room air, afebrile and assessed sitting upright in chair (after consulting with RN).  Oral motor exam revealed labial Cerebrovascular accident (CVA), unspecified mechanism ([facility] [address]  Active Problems:    Cerebrovascular accident (CVA) [facility])      Past Medical History  Past Medical History:   Diagnosis Date   • Cataract 2014   • Epiretinal membrane 2014   • Floaters 2014   • Mac for your referral.   If you have any questions, please contact     Amanda Ortiz M.S. CCC/SLP  Speech-Language Pathologist  [facility]  #[phone]

## 2020-04-02 NOTE — PLAN OF CARE
NEURO Q4 NOW WNL. NO NUMBNESS/TINGLING/WEAKNESS IN LEFT ARM OR HAND. CONT IVF. PLAN FOR 2D ECHO AND MRI BRAIN LATER TODAY. CAROTID US COMPLETED. PT/OT/SP TO SEE. CVA EDUCATION GIVEN AND DISCUSSED. CONT TO MONITOR CLOSELY.   Problem: Diabetes/Glucose Contro status  - Initiate measures to prevent increased intracranial pressure  - Maintain blood pressure and fluid volume within ordered parameters to optimize cerebral perfusion and minimize risk of hemorrhage  - Monitor temperature, glucose, and sodium.  Initiat

## 2020-04-02 NOTE — PLAN OF CARE
Problem: Diabetes/Glucose Control  Goal: Glucose maintained within prescribed range  Description  INTERVENTIONS:  - Monitor Blood Glucose as ordered  - Assess for signs and symptoms of hyperglycemia and hypoglycemia  - Administer ordered medications to m minimize risk of hemorrhage  - Monitor temperature, glucose, and sodium.  Initiate appropriate interventions as ordered  Outcome: Adequate for Discharge  Goal: Remains free of injury related to seizure activity  Description  INTERVENTIONS:  - Maintain airwa

## 2020-04-02 NOTE — PHYSICAL THERAPY NOTE
PHYSICAL THERAPY EVALUATION - INPATIENT     Room Number: 315/315-A  Evaluation Date: 4/2/2020  Type of Evaluation: Initial   Physician Order: PT Eval and Treat    Presenting Problem: TIA  Reason for Therapy: Mobility Dysfunction and Discharge Planning Past Surgical History  Past Surgical History:   Procedure Laterality Date   • REPAIR INCISIONAL HERNIA,RICKY  5/16/15    repair of incarcerated/strangulated R inguinal hernia with reduction of the incarcerated/strangulated lipoma of the preperitoneu Climbing 3-5 steps with a railing?: A Little     AM-PAC Score:  Raw Score: 23   Approx Degree of Impairment: 11.2%   Standardized Score (AM-PAC Scale): 56.93   CMS Modifier (G-Code): CI    FUNCTIONAL ABILITY STATUS  Gait Assessment   Gait Assistance: Super

## 2020-04-02 NOTE — CONSULTS
[facility]    Report of Consultation    Migdalia Carrero Patient Status:  Inpatient    1935 MRN [id number]   Location [facility] 3W/SW Attending Analilia Simons MD   Hosp Day # 1 PCP Payam Tena MD     Date of Admission: Concern  Caffeine Concern        Yes    Comment:Coffee, 3 cups daily  Exercise                Yes    Comment:walking 30 minutes daily.     Social History Narrative        Refill policies:        Allow 2 business days for refills; controlled substances may t done without insurance authorization, patient may be responsible for the entire amount billed.               Current Medications:  Potassium Chloride ER (K-DUR M20) CR tab 40 mEq, 40 mEq, Oral, Once  0.9% NaCl infusion, , Intravenous, Continuous  acetaminop mouth once daily. folic acid 1 MG Oral Tab, Take 1 tablet (1 mg total) by mouth daily. Vitamin B-12 500 MCG Oral Tab, Take 1 tablet (500 mcg total) by mouth daily.   aspirin (ECOTRIN LOW STRENGTH) 81 MG Oral Tab EC, Take 1 tablet (81 mg total) by mouth da extremities 5/5 proximally and distally    Sensory Exam:  Light touch sensation- intact in all 4 extremities    Deep Tendon Reflexes:  Biceps 2+ bilateral symmetric  Triceps 2+ bilateral symmetric  Brachioradialis 2 + bilateral symmetric  Patellar 2+ bilat intracranial process. 2. Mild generalized atrophy and mild chronic microangiopathic ischemic changes. 3. Large vessel calcific atherosclerosis. This report was called immediately at 16:43 hours to ER pod 4 and discussed with Dr. Maren Flores.      Dict

## 2020-04-02 NOTE — ED NOTES
Pt son Travis Varma can be reached at [phone]. Pt son Mena Alex can be reached at [phone]  Pt daughter Awa Estrella can be reached at [phone].

## 2020-04-02 NOTE — PROGRESS NOTES
Patient cleared for discharge, went over importance of follow up appointment with neurologist. Pt/ot/speech evaluated patient, all cleared for discharge. Handed over plavix script to patient, went over purpose and side effect of medication.  All questions a

## 2020-04-02 NOTE — OCCUPATIONAL THERAPY NOTE
OCCUPATIONAL THERAPY EVALUATION - INPATIENT     Room Number: 315/315-A  Evaluation Date: 4/2/2020  Type of Evaluation: Initial  Presenting Problem: (CVA? )    Physician Order: IP Consult to Occupational Therapy  Reason for Therapy: ADL/IADL Dysfunction an without retinopathy ([facility] [address] 2014   • Unspecified essential hypertension        Past Surgical History  Past Surgical History:   Procedure Laterality Date   • REPAIR INCISIONAL HERNIA,RICKY  5/16/15    repair of incarcerated/strangulated R inguinal hernia wit Degree of Impairment: 0%  Standardized Score (AM-PAC Scale): 57.54  CMS Modifier (G-Code): CH    FUNCTIONAL TRANSFER ASSESSMENT  Supine to Sit : Independent  Sit to Stand: Independent  Toilet Transfer: indep  Shower Transfer: indep  Chair Transfer: indep

## 2020-04-02 NOTE — CM/SW NOTE
Received MDO for [facility] felipe. Per RN rounds - pt most likely has no needs at d/c.    SW met w/ pt in his room. Pt verified his address and confirmed living in a 2 level home w/ a basement.  Pt reports having approx 12 stairs in home and 1 step outside to get in

## 2020-04-03 ENCOUNTER — PATIENT OUTREACH (OUTPATIENT)
Dept: CASE MANAGEMENT | Age: 85
End: 2020-04-03

## 2020-04-03 DIAGNOSIS — Z02.9 ENCOUNTERS FOR ADMINISTRATIVE PURPOSE: ICD-10-CM

## 2020-04-03 DIAGNOSIS — I63.9 CEREBROVASCULAR ACCIDENT (CVA), UNSPECIFIED MECHANISM (HCC): ICD-10-CM

## 2020-04-03 PROCEDURE — 1111F DSCHRG MED/CURRENT MED MERGE: CPT

## 2020-04-03 NOTE — PROGRESS NOTES
Initial Post Discharge Follow Up   Discharge Date: 4/2/20  Contact Date: 4/3/2020    Consent Verification:  Assessment Completed With: Patient  HIPAA Verified?   Yes    Discharge Dx:   Cerebrovascular accident        General:   • How have you been since PHILL) In Vitro Strip        • (NCM)  Were there any medication changes noted on AVS?  yes  o If so, were these medication changes discussed with you prior to leaving the hospital? yes  • (NCM) If a new medication was prescribed:    o Was the new medicatio he does not monitor his blood sugar. Med review completed. NCM instructed on bleeding precautions; he verbalized understanding. NCM scheduled HFU with PCP for 4/10/2020. He denied having any questions or concerns at this time. CCM referral placed:   No

## 2020-04-10 ENCOUNTER — OFFICE VISIT (OUTPATIENT)
Dept: FAMILY MEDICINE CLINIC | Facility: CLINIC | Age: 85
End: 2020-04-10
Payer: MEDICARE

## 2020-04-10 VITALS
DIASTOLIC BLOOD PRESSURE: 76 MMHG | TEMPERATURE: 97 F | HEIGHT: 69.5 IN | SYSTOLIC BLOOD PRESSURE: 121 MMHG | BODY MASS INDEX: 25.86 KG/M2 | WEIGHT: 178.63 LBS | HEART RATE: 77 BPM

## 2020-04-10 DIAGNOSIS — I63.9 CEREBROVASCULAR ACCIDENT (CVA), UNSPECIFIED MECHANISM (HCC): Primary | ICD-10-CM

## 2020-04-10 PROCEDURE — 99495 TRANSJ CARE MGMT MOD F2F 14D: CPT | Performed by: FAMILY MEDICINE

## 2020-04-10 PROCEDURE — 1111F DSCHRG MED/CURRENT MED MERGE: CPT | Performed by: FAMILY MEDICINE

## 2020-04-10 NOTE — PROGRESS NOTES
HPI:    Sandro Arias is a 82year old male here today for hospital follow up.    He was discharged from Inpatient hospital[facility]  to Home   Admission Date: 4/1/20   Discharge Date: 4/2/20  Hospital Discharge Diagnoses (since 3/11/2020) mouth daily.   Accu-Chek Multiclix Lancets Does not apply Misc,   Blood Glucose Monitoring Suppl (ACCU-CHEK PHILL PLUS) W/DEVICE Does not apply Kit,   Glucose Blood (ACCU-CHEK PHILL) In Vitro Strip,     No current facility-administered medications on file p Wt 178 lb 9.6 oz (81 kg)   BMI 26.00 kg/m²    GENERAL: well developed, well nourished, in no apparent distress  SKIN: no rashes, no suspicious lesions  HEENT: atraumatic, normocephalic, ears and throat are clear  EYES: PERRLA, EOMI, conjunctiva are clear

## 2020-04-28 RX ORDER — AMLODIPINE BESYLATE 5 MG/1
TABLET ORAL
Qty: 90 TABLET | Refills: 0 | Status: SHIPPED | OUTPATIENT
Start: 2020-04-28 | End: 2020-07-30

## 2020-04-28 RX ORDER — LISINOPRIL 20 MG/1
TABLET ORAL
Qty: 90 TABLET | Refills: 0 | Status: SHIPPED | OUTPATIENT
Start: 2020-04-28 | End: 2020-07-30

## 2020-04-29 ENCOUNTER — VIRTUAL PHONE E/M (OUTPATIENT)
Dept: NEUROLOGY | Facility: CLINIC | Age: 85
End: 2020-04-29

## 2020-04-29 DIAGNOSIS — I63.9 CEREBROVASCULAR ACCIDENT (CVA), UNSPECIFIED MECHANISM (HCC): Primary | ICD-10-CM

## 2020-04-29 PROCEDURE — G2012 BRIEF CHECK IN BY MD/QHP: HCPCS | Performed by: OTHER

## 2020-04-29 RX ORDER — CLOPIDOGREL BISULFATE 75 MG/1
75 TABLET ORAL DAILY
Qty: 90 TABLET | Refills: 3 | Status: SHIPPED | OUTPATIENT
Start: 2020-04-29 | End: 2021-04-15

## 2020-04-29 NOTE — PROGRESS NOTES
This visit is conducted using Telemedicine with live, interactive audio. Please note that the following visit was completed using two-way, real-time interactive audio and/or video communication.   This has been done in good cate to provide continuity of hypertension        Past Surgical History:   Procedure Laterality Date   • REPAIR INCISIONAL HERNIA,RICKY  5/16/15    repair of incarcerated/strangulated R inguinal hernia with reduction of the incarcerated/strangulated lipoma of the preperitoneum and ins intact- recent and remote  Mood intact  Fund of knowledge appropriate for education and age    Language intact including: comprehension, naming, repetition, vocabulary    Cranial Nerves:    VIII. Hearing intact to whisper. IX.  No dysarthria        Labs:

## 2020-07-30 RX ORDER — AMLODIPINE BESYLATE 5 MG/1
TABLET ORAL
Qty: 90 TABLET | Refills: 0 | Status: SHIPPED | OUTPATIENT
Start: 2020-07-30 | End: 2020-10-28

## 2020-07-30 RX ORDER — LOVASTATIN 20 MG/1
TABLET ORAL
Qty: 90 TABLET | Refills: 0 | Status: SHIPPED | OUTPATIENT
Start: 2020-07-30 | End: 2020-10-28

## 2020-07-30 RX ORDER — LISINOPRIL 20 MG/1
TABLET ORAL
Qty: 90 TABLET | Refills: 0 | Status: SHIPPED | OUTPATIENT
Start: 2020-07-30 | End: 2020-10-28

## 2020-10-22 ENCOUNTER — TELEPHONE (OUTPATIENT)
Dept: FAMILY MEDICINE CLINIC | Facility: CLINIC | Age: 85
End: 2020-10-22

## 2020-10-22 DIAGNOSIS — E11.9 DIABETES MELLITUS TYPE 2 IN NONOBESE (HCC): Primary | ICD-10-CM

## 2020-10-22 NOTE — TELEPHONE ENCOUNTER
Pt would like to know if the doctor would like him to make an appointment to see the doctor . Pt was last seen in April, 2020.  Pt is not sure if he is due for another appointment

## 2020-10-23 NOTE — TELEPHONE ENCOUNTER
Pt made an appointment for him to see Dr. Chase Marvin for 10-28-20 for follow up, but, would like to know if the doctor would like him to do blood work prior to his appointment. Pt can be reached at [phone].

## 2020-10-26 ENCOUNTER — LAB ENCOUNTER (OUTPATIENT)
Dept: LAB | Age: 85
End: 2020-10-26
Attending: FAMILY MEDICINE
Payer: MEDICARE

## 2020-10-26 DIAGNOSIS — E11.9 DIABETES MELLITUS TYPE 2 IN NONOBESE (HCC): ICD-10-CM

## 2020-10-26 PROCEDURE — 83036 HEMOGLOBIN GLYCOSYLATED A1C: CPT

## 2020-10-26 PROCEDURE — 80053 COMPREHEN METABOLIC PANEL: CPT

## 2020-10-26 PROCEDURE — 36415 COLL VENOUS BLD VENIPUNCTURE: CPT

## 2020-10-28 ENCOUNTER — OFFICE VISIT (OUTPATIENT)
Dept: FAMILY MEDICINE CLINIC | Facility: CLINIC | Age: 85
End: 2020-10-28
Payer: MEDICARE

## 2020-10-28 VITALS
DIASTOLIC BLOOD PRESSURE: 65 MMHG | SYSTOLIC BLOOD PRESSURE: 100 MMHG | HEART RATE: 81 BPM | WEIGHT: 177 LBS | HEIGHT: 69.5 IN | BODY MASS INDEX: 25.63 KG/M2

## 2020-10-28 DIAGNOSIS — E11.9 DIABETES MELLITUS TYPE 2 IN NONOBESE (HCC): Primary | ICD-10-CM

## 2020-10-28 PROCEDURE — G0008 ADMIN INFLUENZA VIRUS VAC: HCPCS | Performed by: FAMILY MEDICINE

## 2020-10-28 PROCEDURE — 90471 IMMUNIZATION ADMIN: CPT | Performed by: FAMILY MEDICINE

## 2020-10-28 PROCEDURE — 90662 IIV NO PRSV INCREASED AG IM: CPT | Performed by: FAMILY MEDICINE

## 2020-10-28 PROCEDURE — 90750 HZV VACC RECOMBINANT IM: CPT | Performed by: FAMILY MEDICINE

## 2020-10-28 PROCEDURE — 99213 OFFICE O/P EST LOW 20 MIN: CPT | Performed by: FAMILY MEDICINE

## 2020-10-28 PROCEDURE — G0463 HOSPITAL OUTPT CLINIC VISIT: HCPCS | Performed by: FAMILY MEDICINE

## 2020-10-28 RX ORDER — LOVASTATIN 20 MG/1
TABLET ORAL
Qty: 90 TABLET | Refills: 0 | Status: SHIPPED | OUTPATIENT
Start: 2020-10-28 | End: 2021-01-25

## 2020-10-28 RX ORDER — AMLODIPINE BESYLATE 5 MG/1
5 TABLET ORAL DAILY
Qty: 90 TABLET | Refills: 0 | Status: SHIPPED | OUTPATIENT
Start: 2020-10-28 | End: 2021-01-25

## 2020-10-28 RX ORDER — LISINOPRIL 20 MG/1
20 TABLET ORAL DAILY
Qty: 90 TABLET | Refills: 0 | Status: SHIPPED | OUTPATIENT
Start: 2020-10-28 | End: 2021-01-25

## 2020-10-28 NOTE — PROGRESS NOTES
HPI:    Patient ID: Riley Mtz is a 82year old male. Name: Riley Mtz  Date: 10/28/20    HISTORY OF PRESENT ILLNESS   Riley Mtz is a 82year old male who presents for     Prior HbA, C or glycohemoglobin were 6.1 %.   Dietary compli tablet 3   • Accu-Chek Multiclix Lancets Does not apply Misc      • Blood Glucose Monitoring Suppl (ACCU-CHEK PHILL PLUS) W/DEVICE Does not apply Kit      • Glucose Blood (ACCU-CHEK PHILL) In Vitro Strip        Allergies:No Known Allergies   PHYSICAL EXAM:

## 2020-12-02 ENCOUNTER — TELEPHONE (OUTPATIENT)
Dept: FAMILY MEDICINE CLINIC | Facility: CLINIC | Age: 85
End: 2020-12-02

## 2020-12-02 DIAGNOSIS — H25.9 SENILE CATARACT, UNSPECIFIED AGE-RELATED CATARACT TYPE, UNSPECIFIED LATERALITY: Primary | ICD-10-CM

## 2020-12-02 DIAGNOSIS — I48.91 ATRIAL FIBRILLATION, UNSPECIFIED TYPE (HCC): ICD-10-CM

## 2020-12-02 NOTE — TELEPHONE ENCOUNTER
Francois from [place] states he will be faxing some forms for patient. Will call back later to see if they were received.   SADIA

## 2020-12-29 ENCOUNTER — TELEPHONE (OUTPATIENT)
Dept: FAMILY MEDICINE CLINIC | Facility: CLINIC | Age: 85
End: 2020-12-29

## 2020-12-29 NOTE — TELEPHONE ENCOUNTER
Pt would like to know if he is due to get his 2nd shingles vaccine. Pt is not sure if it was one or two doses for the vaccine. If he is due he would like to schedule an appointment for it.

## 2020-12-29 NOTE — TELEPHONE ENCOUNTER
Spoke to patient to let him know his second shingles vaccine is due 2-6 months from the first one.  He was transferred to the phone room to schedule

## 2021-01-12 ENCOUNTER — NURSE ONLY (OUTPATIENT)
Dept: FAMILY MEDICINE CLINIC | Facility: CLINIC | Age: 86
End: 2021-01-12
Payer: MEDICARE

## 2021-01-12 ENCOUNTER — TELEPHONE (OUTPATIENT)
Dept: FAMILY MEDICINE CLINIC | Facility: CLINIC | Age: 86
End: 2021-01-12

## 2021-01-12 DIAGNOSIS — Z23 IMMUNIZATION DUE: Primary | ICD-10-CM

## 2021-01-12 PROCEDURE — 90750 HZV VACC RECOMBINANT IM: CPT | Performed by: PHYSICIAN ASSISTANT

## 2021-01-12 PROCEDURE — 90471 IMMUNIZATION ADMIN: CPT | Performed by: PHYSICIAN ASSISTANT

## 2021-01-12 NOTE — TELEPHONE ENCOUNTER
Dr. Zofia Felipe is not here today are you able to put in an order for shingrix pt is here for a nurse visit.

## 2021-01-12 NOTE — TELEPHONE ENCOUNTER
Dr Chyna Segovia pt is scheduled for nurse visit today #2 at 10.00am. First shingles shot and LOV with you 10/28/2020. Can you please put the order in. Thank you.

## 2021-01-12 NOTE — TELEPHONE ENCOUNTER
Dr Karina Page pt is feeling that he will need 2nd eye surgery. Can you recommend  an ophthalmogist. His previous dr retired. Patient is requesting a call back. Thank you.

## 2021-01-25 RX ORDER — LOVASTATIN 20 MG/1
TABLET ORAL
Qty: 90 TABLET | Refills: 0 | Status: SHIPPED | OUTPATIENT
Start: 2021-01-25 | End: 2021-04-15

## 2021-01-25 RX ORDER — LISINOPRIL 20 MG/1
TABLET ORAL
Qty: 90 TABLET | Refills: 0 | Status: SHIPPED | OUTPATIENT
Start: 2021-01-25 | End: 2021-04-15

## 2021-01-25 RX ORDER — AMLODIPINE BESYLATE 5 MG/1
TABLET ORAL
Qty: 90 TABLET | Refills: 0 | Status: SHIPPED | OUTPATIENT
Start: 2021-01-25 | End: 2021-04-15

## 2021-02-02 DIAGNOSIS — Z23 NEED FOR VACCINATION: ICD-10-CM

## 2021-04-09 ENCOUNTER — LAB ENCOUNTER (OUTPATIENT)
Dept: LAB | Age: 86
End: 2021-04-09
Attending: FAMILY MEDICINE
Payer: MEDICARE

## 2021-04-09 DIAGNOSIS — E11.9 DIABETES MELLITUS (HCC): Primary | ICD-10-CM

## 2021-04-09 PROCEDURE — 85025 COMPLETE CBC W/AUTO DIFF WBC: CPT

## 2021-04-09 PROCEDURE — 36415 COLL VENOUS BLD VENIPUNCTURE: CPT

## 2021-04-09 PROCEDURE — 82570 ASSAY OF URINE CREATININE: CPT

## 2021-04-09 PROCEDURE — 80053 COMPREHEN METABOLIC PANEL: CPT

## 2021-04-09 PROCEDURE — 83036 HEMOGLOBIN GLYCOSYLATED A1C: CPT

## 2021-04-09 PROCEDURE — 82043 UR ALBUMIN QUANTITATIVE: CPT

## 2021-04-09 PROCEDURE — 84443 ASSAY THYROID STIM HORMONE: CPT

## 2021-04-09 PROCEDURE — 80061 LIPID PANEL: CPT

## 2021-04-15 ENCOUNTER — OFFICE VISIT (OUTPATIENT)
Dept: FAMILY MEDICINE CLINIC | Facility: CLINIC | Age: 86
End: 2021-04-15
Payer: MEDICARE

## 2021-04-15 VITALS
HEART RATE: 79 BPM | BODY MASS INDEX: 25.92 KG/M2 | DIASTOLIC BLOOD PRESSURE: 78 MMHG | HEIGHT: 69.5 IN | WEIGHT: 179 LBS | SYSTOLIC BLOOD PRESSURE: 128 MMHG

## 2021-04-15 DIAGNOSIS — E11.9 DIABETES MELLITUS TYPE 2 IN NONOBESE (HCC): Primary | ICD-10-CM

## 2021-04-15 DIAGNOSIS — E78.5 HYPERLIPIDEMIA, UNSPECIFIED HYPERLIPIDEMIA TYPE: ICD-10-CM

## 2021-04-15 DIAGNOSIS — G45.9 TIA (TRANSIENT ISCHEMIC ATTACK): ICD-10-CM

## 2021-04-15 PROCEDURE — 99214 OFFICE O/P EST MOD 30 MIN: CPT | Performed by: FAMILY MEDICINE

## 2021-04-15 RX ORDER — AMLODIPINE BESYLATE 5 MG/1
5 TABLET ORAL DAILY
Qty: 90 TABLET | Refills: 0 | Status: SHIPPED | OUTPATIENT
Start: 2021-04-15 | End: 2021-08-11

## 2021-04-15 RX ORDER — FOLIC ACID 1 MG/1
1 TABLET ORAL DAILY
Qty: 90 TABLET | Refills: 3 | Status: SHIPPED | OUTPATIENT
Start: 2021-04-15

## 2021-04-15 RX ORDER — CHOLECALCIFEROL (VITAMIN D3) 125 MCG
500 CAPSULE ORAL DAILY
Qty: 90 TABLET | Refills: 3 | Status: SHIPPED | OUTPATIENT
Start: 2021-04-15

## 2021-04-15 RX ORDER — CLOPIDOGREL BISULFATE 75 MG/1
75 TABLET ORAL DAILY
Qty: 90 TABLET | Refills: 3 | Status: SHIPPED | OUTPATIENT
Start: 2021-04-15

## 2021-04-15 RX ORDER — LISINOPRIL 20 MG/1
20 TABLET ORAL DAILY
Qty: 90 TABLET | Refills: 0 | Status: SHIPPED | OUTPATIENT
Start: 2021-04-15 | End: 2021-08-11

## 2021-04-15 RX ORDER — ROSUVASTATIN CALCIUM 20 MG/1
20 TABLET, COATED ORAL NIGHTLY
Qty: 90 TABLET | Refills: 1 | Status: SHIPPED | OUTPATIENT
Start: 2021-04-15 | End: 2021-10-21

## 2021-04-15 NOTE — PROGRESS NOTES
HPI/Subjective:   Patient ID: Sandro Arias is a 82year old male. Name: Sandro Arias  Date: 4/15/2021    Referring Physician: No ref.  provider found    HISTORY OF PRESENT ILLNESS   Sandro Arias is a 82year old male who presents for     P • folic acid 1 MG Oral Tab Take 1 tablet (1 mg total) by mouth daily. 90 tablet 3   • Vitamin B-12 500 MCG Oral Tab Take 1 tablet (500 mcg total) by mouth daily.  90 tablet 3   • Accu-Chek Multiclix Lancets Does not apply Misc      • Blood Glucose Monitor

## 2021-05-28 ENCOUNTER — TELEPHONE (OUTPATIENT)
Dept: FAMILY MEDICINE CLINIC | Facility: CLINIC | Age: 86
End: 2021-05-28

## 2021-05-28 NOTE — TELEPHONE ENCOUNTER
Patient has pre surg visit scheduled 6/2 with Dr Jacky Castaneda.  Pre op paperwork is in Cordelia's red folder at nurses station

## 2021-06-01 ENCOUNTER — TELEPHONE (OUTPATIENT)
Dept: FAMILY MEDICINE CLINIC | Facility: CLINIC | Age: 86
End: 2021-06-01

## 2021-06-07 ENCOUNTER — TELEPHONE (OUTPATIENT)
Dept: FAMILY MEDICINE CLINIC | Facility: CLINIC | Age: 86
End: 2021-06-07

## 2021-06-07 NOTE — TELEPHONE ENCOUNTER
Patient is calling to reschedule his Pre-Op Clearance. He is scheduled for Cataract Surgery on 6/15. He needs to have his Presurgical Clearance ASAP. However, Dr's Schedule looks to be on hold most of/all this week.      Is there any way we can Squeeze him

## 2021-06-08 ENCOUNTER — OFFICE VISIT (OUTPATIENT)
Dept: FAMILY MEDICINE CLINIC | Facility: CLINIC | Age: 86
End: 2021-06-08
Payer: MEDICARE

## 2021-06-08 VITALS
HEART RATE: 73 BPM | WEIGHT: 173 LBS | HEIGHT: 69.5 IN | SYSTOLIC BLOOD PRESSURE: 123 MMHG | DIASTOLIC BLOOD PRESSURE: 72 MMHG | BODY MASS INDEX: 25.05 KG/M2

## 2021-06-08 DIAGNOSIS — Z01.818 PREOPERATIVE CLEARANCE: Primary | ICD-10-CM

## 2021-06-08 PROCEDURE — 99214 OFFICE O/P EST MOD 30 MIN: CPT | Performed by: FAMILY MEDICINE

## 2021-06-09 NOTE — PROGRESS NOTES
HPI/Subjective:   Patient ID: Karely Mcmahon is a 82year old male. Patient here for preop clearance for Dr. Brody Wisdom. Going to have catra t surgery. Denies any systemic complaints. No chest pain no shortness of breath.  Overall well      H Plan:   preop clearance- patient clear for surgery     No orders of the defined types were placed in this encounter.       Meds This Visit:  Requested Prescriptions      No prescriptions requested or ordered in this encounter       Imaging & Referrals:  Non

## 2021-08-11 RX ORDER — AMLODIPINE BESYLATE 5 MG/1
TABLET ORAL
Qty: 90 TABLET | Refills: 0 | Status: SHIPPED | OUTPATIENT
Start: 2021-08-11 | End: 2021-10-21

## 2021-08-11 RX ORDER — LISINOPRIL 20 MG/1
TABLET ORAL
Qty: 90 TABLET | Refills: 0 | Status: SHIPPED | OUTPATIENT
Start: 2021-08-11 | End: 2021-10-21

## 2021-09-17 ENCOUNTER — OFFICE VISIT (OUTPATIENT)
Dept: FAMILY MEDICINE CLINIC | Facility: CLINIC | Age: 86
End: 2021-09-17
Payer: MEDICARE

## 2021-09-17 VITALS
HEIGHT: 69.5 IN | WEIGHT: 173 LBS | BODY MASS INDEX: 25.05 KG/M2 | DIASTOLIC BLOOD PRESSURE: 77 MMHG | HEART RATE: 75 BPM | SYSTOLIC BLOOD PRESSURE: 118 MMHG

## 2021-09-17 DIAGNOSIS — Z01.818 PREOP TESTING: Primary | ICD-10-CM

## 2021-09-17 PROCEDURE — 99214 OFFICE O/P EST MOD 30 MIN: CPT | Performed by: FAMILY MEDICINE

## 2021-09-17 NOTE — PROGRESS NOTES
Subjective:   Patient ID: Jerome Davis is a 82year old male. Patient here for clearance for cataract surgery. No chest pain no shortness of breath no abdominal pain. History/Other:   Review of Systems   Constitutional: Negative.   Negative fo This Encounter      CBC With Differential With Platelet      Hemoglobin A1C      Comp Metabolic Panel (14)      Meds This Visit:  Requested Prescriptions      No prescriptions requested or ordered in this encounter       Imaging & Referrals:  None

## 2021-09-21 ENCOUNTER — TELEPHONE (OUTPATIENT)
Dept: FAMILY MEDICINE CLINIC | Facility: CLINIC | Age: 86
End: 2021-09-21

## 2021-09-21 NOTE — TELEPHONE ENCOUNTER
Par calling from [facility] , looking for pre-op exam for surgical clearance     Please fax to Pat at   [phone]  ASAP and thank you

## 2021-09-23 NOTE — TELEPHONE ENCOUNTER
Cece Jones MD  Em Rn Triage 14 hours ago (8:50 PM)         Yes I signed the papers    Message text      Please fax surgical clearance.

## 2021-10-08 ENCOUNTER — LAB ENCOUNTER (OUTPATIENT)
Dept: LAB | Age: 86
End: 2021-10-08
Attending: FAMILY MEDICINE
Payer: MEDICARE

## 2021-10-08 DIAGNOSIS — Z01.818 PREOP TESTING: ICD-10-CM

## 2021-10-08 DIAGNOSIS — E11.9 DIABETES MELLITUS TYPE 2 IN NONOBESE (HCC): ICD-10-CM

## 2021-10-08 DIAGNOSIS — E78.5 HYPERLIPIDEMIA, UNSPECIFIED HYPERLIPIDEMIA TYPE: ICD-10-CM

## 2021-10-08 PROCEDURE — 83036 HEMOGLOBIN GLYCOSYLATED A1C: CPT

## 2021-10-08 PROCEDURE — 85025 COMPLETE CBC W/AUTO DIFF WBC: CPT

## 2021-10-08 PROCEDURE — 36415 COLL VENOUS BLD VENIPUNCTURE: CPT

## 2021-10-08 PROCEDURE — 82248 BILIRUBIN DIRECT: CPT

## 2021-10-08 PROCEDURE — 80061 LIPID PANEL: CPT

## 2021-10-08 PROCEDURE — 80053 COMPREHEN METABOLIC PANEL: CPT

## 2021-10-12 ENCOUNTER — OFFICE VISIT (OUTPATIENT)
Dept: FAMILY MEDICINE CLINIC | Facility: CLINIC | Age: 86
End: 2021-10-12
Payer: MEDICARE

## 2021-10-12 VITALS
DIASTOLIC BLOOD PRESSURE: 71 MMHG | HEART RATE: 76 BPM | BODY MASS INDEX: 26.82 KG/M2 | WEIGHT: 179 LBS | HEIGHT: 68.5 IN | SYSTOLIC BLOOD PRESSURE: 110 MMHG

## 2021-10-12 DIAGNOSIS — Z12.11 SCREENING FOR COLON CANCER: Primary | ICD-10-CM

## 2021-10-12 DIAGNOSIS — I63.9 CEREBROVASCULAR ACCIDENT (CVA), UNSPECIFIED MECHANISM (HCC): ICD-10-CM

## 2021-10-12 DIAGNOSIS — H35.30 MACULAR DEGENERATION, UNSPECIFIED LATERALITY, UNSPECIFIED TYPE: ICD-10-CM

## 2021-10-12 DIAGNOSIS — E11.9 DIET-CONTROLLED DIABETES MELLITUS (HCC): ICD-10-CM

## 2021-10-12 DIAGNOSIS — Z00.00 ENCOUNTER FOR ANNUAL HEALTH EXAMINATION: ICD-10-CM

## 2021-10-12 PROCEDURE — 90662 IIV NO PRSV INCREASED AG IM: CPT | Performed by: FAMILY MEDICINE

## 2021-10-12 PROCEDURE — G0439 PPPS, SUBSEQ VISIT: HCPCS | Performed by: FAMILY MEDICINE

## 2021-10-12 PROCEDURE — G0008 ADMIN INFLUENZA VIRUS VAC: HCPCS | Performed by: FAMILY MEDICINE

## 2021-10-12 PROCEDURE — 99213 OFFICE O/P EST LOW 20 MIN: CPT | Performed by: FAMILY MEDICINE

## 2021-10-12 NOTE — PATIENT INSTRUCTIONS
Ivonne Martin's SCREENING SCHEDULE   Tests on this list are recommended by your physician but may not be covered, or covered at this frequency, by your insurer. Please check with your insurance carrier before scheduling to verify coverage.    PREVEN Pneumococcal Each vaccine ([id number] & [id number]) covered once after 65 Prevnar 13: -    [id number]: 11/09/2010     No recommendations at this time    Hepatitis B One screening covered for patients with certain risk factors   -  No recommendations at including definitions of the different types of Advance Directives. It also has the State forms available on it's website for anyone to review and print using their home computer and printer. (the forms are also available in [address])  www. [email or web address]. or

## 2021-10-12 NOTE — PROGRESS NOTES
HPI:   iVvienne Mclaughlin is a 82year old male who presents for a Medicare Subsequent Annual Wellness visit (Pt already had Initial Annual Wellness).       His last annual assessment has been over 1 year: Annual Physical due on 05/17/2020         He has bee TRIG 76 10/08/2021          Last Chemistry Labs:   Lab Results   Component Value Date    AST 16 10/08/2021    ALT 18 10/08/2021    CA 9.1 10/08/2021    ALB 3.5 10/08/2021    TSH 0.890 04/09/2021    CREATSERUM 0.97 10/08/2021    GLU 95 10/08/2021 double vision  HEENT: denies nasal congestion, sinus pain or ST  LUNGS: denies shortness of breath with exertion  CARDIOVASCULAR: denies chest pain on exertion  GI: denies abdominal pain, denies heartburn  : 1 per night nocturia, no complaint of urinary think I may have hearing loss:  No                  Visual Acuity                           General Appearance:  Alert, cooperative, no distress, appears stated age   Head:  Normocephalic, without obvious abnormality, atraumatic   Eyes:  PERRL, conjunctiva/ RELEVANT CHRONIC CONDITIONS:   Jose Carlos Richards is a 82year old male who presents for a Medicare Assessment.      PLAN SUMMARY:   Diagnoses and all orders for this visit:    Screening for colon cancer  -     OP REFERRAL TO [facility] GI TELEPHONE COLON SCREEN Abdominal Aortic Aneurysm (AAA) Covered once in a lifetime for one of the following risk factors   • Men who are 73-68 years old and have ever smoked   • Anyone with a family history -     Colorectal Cancer Screening  Covered for ages 52-80; only need ONE 10/08/2021       Dilated Eye Exam Annually 01/30/2020       Annual Monitoring of Persistent Medications (ACE/ARB, digoxin diuretics, anticonvulsants)    Potassium Annually Lab Results   Component Value Date    K 4.1 10/08/2021         Creatinine   Annually

## 2021-10-21 RX ORDER — LISINOPRIL 20 MG/1
20 TABLET ORAL DAILY
Qty: 90 TABLET | Refills: 1 | Status: SHIPPED | OUTPATIENT
Start: 2021-10-21 | End: 2022-04-21

## 2021-10-21 RX ORDER — ROSUVASTATIN CALCIUM 20 MG/1
20 TABLET, COATED ORAL NIGHTLY
Qty: 90 TABLET | Refills: 1 | Status: SHIPPED | OUTPATIENT
Start: 2021-10-21 | End: 2022-04-21

## 2021-10-21 RX ORDER — AMLODIPINE BESYLATE 5 MG/1
5 TABLET ORAL DAILY
Qty: 90 TABLET | Refills: 1 | Status: SHIPPED | OUTPATIENT
Start: 2021-10-21 | End: 2022-04-21

## 2021-10-21 NOTE — TELEPHONE ENCOUNTER
Refill passed per [facility] protocol.   Requested Prescriptions   Pending Prescriptions Disp Refills    ROSUVASTATIN 20 MG Oral Tab [Pharmacy Med Name: Rosuvastatin Calcium Oral Tablet 20 MG] 90 tablet 0     Sig: TAKE ONE TABLET (20 MG TOTAL) BY MOUTH NIGHTLY        Cholesterol Medication Protocol Passed - 10/21/2021 10:17 AM        Passed - ALT in past 12 months        Passed - LDL in past 12 months        Passed - Last ALT < 80       Lab Results   Component Value Date    ALT 18 10/08/2021             Passed - Last LDL < 130     Lab Results   Component Value Date    LDL 79 10/08/2021               Passed - Appointment in past 12 or next 3 months           LISINOPRIL 20 MG Oral Tab [Pharmacy Med Name: Lisinopril Oral Tablet 20 MG] 90 tablet 0     Sig: TAKE ONE TABLET BY MOUTH ONE TIME DAILY        Hypertensive Medications Protocol Passed - 10/21/2021 10:17 AM        Passed - CMP or BMP in past 12 months        Passed - Appointment in past 6 or next 3 months        Passed - GFR Non- > 50     Lab Results   Component Value Date    GFRNAA 71 10/08/2021                    AMLODIPINE 5 MG Oral Tab [Pharmacy Med Name: amLODIPine Besylate Oral Tablet 5 MG] 90 tablet 0     Sig: TAKE ONE TABLET BY MOUTH ONE TIME DAILY        Hypertensive Medications Protocol Passed - 10/21/2021 10:17 AM        Passed - CMP or BMP in past 12 months        Passed - Appointment in past 6 or next 3 months        Passed - GFR Non- > 50     Lab Results   Component Value Date    GFRNAA 71 10/08/2021                        Recent Outpatient Visits              1 week ago Screening for colon cancer    Juan J Vasquez MD    Office Visit    1 month ago Preop testing    Juan J Vasquez MD    Office Visit    4 months ago Preoperative clearance    Juan J Vasquez MD    Office Visit    6 months ago Diabetes mellitus type 2 in nonobese [facility])    Kwame Montano MD    Office Visit    9 months ago Immunization due    [address], [employer], [address]    Nurse Only

## 2022-04-08 ENCOUNTER — TELEPHONE (OUTPATIENT)
Dept: FAMILY MEDICINE CLINIC | Facility: CLINIC | Age: 87
End: 2022-04-08

## 2022-04-08 NOTE — TELEPHONE ENCOUNTER
Patient would like to know if Dr. Dayron Richards would like to order any labs prior to his next appointment scheduled on 4/19.

## 2022-04-12 ENCOUNTER — LAB ENCOUNTER (OUTPATIENT)
Dept: LAB | Age: 87
End: 2022-04-12
Attending: FAMILY MEDICINE
Payer: MEDICARE

## 2022-04-12 DIAGNOSIS — E11.9 DIABETES MELLITUS TYPE 2 IN NONOBESE (HCC): ICD-10-CM

## 2022-04-12 LAB
CREAT UR-SCNC: 199 MG/DL
EST. AVERAGE GLUCOSE BLD GHB EST-MCNC: 128 MG/DL (ref 68–126)
HBA1C MFR BLD: 6.1 % (ref ?–5.7)
MICROALBUMIN UR-MCNC: 1.33 MG/DL
MICROALBUMIN/CREAT 24H UR-RTO: 6.7 UG/MG (ref ?–30)

## 2022-04-12 PROCEDURE — 82570 ASSAY OF URINE CREATININE: CPT

## 2022-04-12 PROCEDURE — 82043 UR ALBUMIN QUANTITATIVE: CPT

## 2022-04-12 PROCEDURE — 83036 HEMOGLOBIN GLYCOSYLATED A1C: CPT

## 2022-04-12 PROCEDURE — 36415 COLL VENOUS BLD VENIPUNCTURE: CPT

## 2022-04-19 ENCOUNTER — OFFICE VISIT (OUTPATIENT)
Dept: FAMILY MEDICINE CLINIC | Facility: CLINIC | Age: 87
End: 2022-04-19
Payer: MEDICARE

## 2022-04-19 VITALS
SYSTOLIC BLOOD PRESSURE: 112 MMHG | HEIGHT: 68.5 IN | HEART RATE: 84 BPM | BODY MASS INDEX: 26.52 KG/M2 | WEIGHT: 177 LBS | DIASTOLIC BLOOD PRESSURE: 69 MMHG

## 2022-04-19 DIAGNOSIS — R73.03 PREDIABETES: ICD-10-CM

## 2022-04-19 DIAGNOSIS — I10 ESSENTIAL HYPERTENSION: Primary | ICD-10-CM

## 2022-04-19 PROCEDURE — G0009 ADMIN PNEUMOCOCCAL VACCINE: HCPCS | Performed by: FAMILY MEDICINE

## 2022-04-19 PROCEDURE — 99214 OFFICE O/P EST MOD 30 MIN: CPT | Performed by: FAMILY MEDICINE

## 2022-04-19 PROCEDURE — 90670 PCV13 VACCINE IM: CPT | Performed by: FAMILY MEDICINE

## 2022-04-21 RX ORDER — AMLODIPINE BESYLATE 5 MG/1
5 TABLET ORAL DAILY
Qty: 90 TABLET | Refills: 1 | Status: SHIPPED | OUTPATIENT
Start: 2022-04-21

## 2022-04-21 RX ORDER — CLOPIDOGREL BISULFATE 75 MG/1
75 TABLET ORAL DAILY
Qty: 90 TABLET | Refills: 1 | Status: SHIPPED | OUTPATIENT
Start: 2022-04-21

## 2022-04-21 RX ORDER — LISINOPRIL 20 MG/1
20 TABLET ORAL DAILY
Qty: 90 TABLET | Refills: 1 | Status: SHIPPED | OUTPATIENT
Start: 2022-04-21

## 2022-04-21 RX ORDER — ROSUVASTATIN CALCIUM 20 MG/1
20 TABLET, COATED ORAL NIGHTLY
Qty: 90 TABLET | Refills: 1 | Status: SHIPPED | OUTPATIENT
Start: 2022-04-21

## 2022-04-21 NOTE — TELEPHONE ENCOUNTER
Refill passed per [place] [employer] protocol.    Requested Prescriptions   Pending Prescriptions Disp Refills    LISINOPRIL 20 MG Oral Tab [Pharmacy Med Name: Lisinopril Oral Tablet 20 MG] 90 tablet 0     Sig: TAKE ONE TABLET BY MOUTH ONE TIME DAILY        Hypertensive Medications Protocol Passed - 4/21/2022 10:58 AM        Passed - CMP or BMP in past 12 months        Passed - Appointment in past 6 or next 3 months        Passed - GFR Non- > 50     Lab Results   Component Value Date    GFRNAA 71 10/08/2021                    ROSUVASTATIN 20 MG Oral Tab [Pharmacy Med Name: Rosuvastatin Calcium Oral Tablet 20 MG] 90 tablet 0     Sig: TAKE ONE TABLET BY MOUTH NIGHTLY        Cholesterol Medication Protocol Passed - 4/21/2022 10:58 AM        Passed - ALT in past 12 months        Passed - LDL in past 12 months        Passed - Last ALT < 80       Lab Results   Component Value Date    ALT 18 10/08/2021             Passed - Last LDL < 130     Lab Results   Component Value Date    LDL 79 10/08/2021               Passed - Appointment in past 12 or next 3 months           CLOPIDOGREL 75 MG Oral Tab [Pharmacy Med Name: Clopidogrel Bisulfate Oral Tablet 75 MG] 90 tablet 0     Sig: TAKE ONE TABLET BY MOUTH ONE TIME DAILY        There is no refill protocol information for this order        AMLODIPINE 5 MG Oral Tab [Pharmacy Med Name: amLODIPine Besylate Oral Tablet 5 MG] 90 tablet 0     Sig: TAKE ONE TABLET BY MOUTH ONE TIME DAILY        Hypertensive Medications Protocol Passed - 4/21/2022 10:58 AM        Passed - CMP or BMP in past 12 months        Passed - Appointment in past 6 or next 3 months        Passed - GFR Non- > 50     Lab Results   Component Value Date    GFRNAA 71 10/08/2021                       Recent Outpatient Visits              2 days ago Essential hypertension    Guy Wallace MD    Office Visit    6 months ago Screening for colon cancer Jose Angel Dubois MD    Office Visit    7 months ago Preop testing    Jose Angel Dubois MD    Office Visit    10 months ago Preoperative clearance    Jose Angel Dubois MD    Office Visit    1 year ago Diabetes mellitus type 2 in nonobese [facility])    Jose Angel Dubois MD    Office Visit

## 2022-09-01 ENCOUNTER — MED REC SCAN ONLY (OUTPATIENT)
Dept: FAMILY MEDICINE CLINIC | Facility: CLINIC | Age: 87
End: 2022-09-01

## 2022-10-10 ENCOUNTER — TELEPHONE (OUTPATIENT)
Dept: FAMILY MEDICINE CLINIC | Facility: CLINIC | Age: 87
End: 2022-10-10

## 2022-10-10 DIAGNOSIS — E11.9 DIABETES MELLITUS TYPE 2 IN NONOBESE (HCC): Primary | ICD-10-CM

## 2022-10-10 NOTE — TELEPHONE ENCOUNTER
Patient is  Requesting order for blood work for [employer] [facility] on 10/21/22. Would like a call back when orders are placed. Ok to leave a voicemail with fasting information.

## 2022-10-12 ENCOUNTER — LAB ENCOUNTER (OUTPATIENT)
Dept: LAB | Age: 87
End: 2022-10-12
Attending: FAMILY MEDICINE
Payer: MEDICARE

## 2022-10-12 DIAGNOSIS — E11.9 DIABETES MELLITUS TYPE 2 IN NONOBESE (HCC): ICD-10-CM

## 2022-10-12 LAB
ALBUMIN SERPL-MCNC: 3.4 G/DL (ref 3.4–5)
ALBUMIN/GLOB SERPL: 1 {RATIO} (ref 1–2)
ALP LIVER SERPL-CCNC: 54 U/L
ALT SERPL-CCNC: 21 U/L
ANION GAP SERPL CALC-SCNC: 6 MMOL/L (ref 0–18)
AST SERPL-CCNC: 17 U/L (ref 15–37)
BASOPHILS # BLD AUTO: 0.09 X10(3) UL (ref 0–0.2)
BASOPHILS NFR BLD AUTO: 1.4 %
BILIRUB SERPL-MCNC: 0.6 MG/DL (ref 0.1–2)
BUN BLD-MCNC: 24 MG/DL (ref 7–18)
BUN/CREAT SERPL: 24.7 (ref 10–20)
CALCIUM BLD-MCNC: 8.8 MG/DL (ref 8.5–10.1)
CHLORIDE SERPL-SCNC: 109 MMOL/L (ref 98–112)
CHOLEST SERPL-MCNC: 125 MG/DL (ref ?–200)
CO2 SERPL-SCNC: 25 MMOL/L (ref 21–32)
CREAT BLD-MCNC: 0.97 MG/DL
DEPRECATED RDW RBC AUTO: 45.3 FL (ref 35.1–46.3)
EOSINOPHIL # BLD AUTO: 0.16 X10(3) UL (ref 0–0.7)
EOSINOPHIL NFR BLD AUTO: 2.5 %
ERYTHROCYTE [DISTWIDTH] IN BLOOD BY AUTOMATED COUNT: 13.3 % (ref 11–15)
EST. AVERAGE GLUCOSE BLD GHB EST-MCNC: 131 MG/DL (ref 68–126)
FASTING PATIENT LIPID ANSWER: YES
FASTING STATUS PATIENT QL REPORTED: YES
GFR SERPLBLD BASED ON 1.73 SQ M-ARVRAT: 77 ML/MIN/1.73M2 (ref 60–?)
GLOBULIN PLAS-MCNC: 3.5 G/DL (ref 2.8–4.4)
GLUCOSE BLD-MCNC: 105 MG/DL (ref 70–99)
HBA1C MFR BLD: 6.2 % (ref ?–5.7)
HCT VFR BLD AUTO: 43.3 %
HDLC SERPL-MCNC: 47 MG/DL (ref 40–59)
HGB BLD-MCNC: 14 G/DL
IMM GRANULOCYTES # BLD AUTO: 0.01 X10(3) UL (ref 0–1)
IMM GRANULOCYTES NFR BLD: 0.2 %
LDLC SERPL CALC-MCNC: 66 MG/DL (ref ?–100)
LYMPHOCYTES # BLD AUTO: 2.23 X10(3) UL (ref 1–4)
LYMPHOCYTES NFR BLD AUTO: 35.3 %
MCH RBC QN AUTO: 29.9 PG (ref 26–34)
MCHC RBC AUTO-ENTMCNC: 32.3 G/DL (ref 31–37)
MCV RBC AUTO: 92.5 FL
MONOCYTES # BLD AUTO: 0.68 X10(3) UL (ref 0.1–1)
MONOCYTES NFR BLD AUTO: 10.8 %
NEUTROPHILS # BLD AUTO: 3.14 X10 (3) UL (ref 1.5–7.7)
NEUTROPHILS # BLD AUTO: 3.14 X10(3) UL (ref 1.5–7.7)
NEUTROPHILS NFR BLD AUTO: 49.8 %
NONHDLC SERPL-MCNC: 78 MG/DL (ref ?–130)
OSMOLALITY SERPL CALC.SUM OF ELEC: 294 MOSM/KG (ref 275–295)
PLATELET # BLD AUTO: 144 10(3)UL (ref 150–450)
POTASSIUM SERPL-SCNC: 4.2 MMOL/L (ref 3.5–5.1)
PROT SERPL-MCNC: 6.9 G/DL (ref 6.4–8.2)
RBC # BLD AUTO: 4.68 X10(6)UL
SODIUM SERPL-SCNC: 140 MMOL/L (ref 136–145)
TRIGL SERPL-MCNC: 54 MG/DL (ref 30–149)
TSI SER-ACNC: 0.76 MIU/ML (ref 0.36–3.74)
VLDLC SERPL CALC-MCNC: 8 MG/DL (ref 0–30)
WBC # BLD AUTO: 6.3 X10(3) UL (ref 4–11)

## 2022-10-12 PROCEDURE — 83036 HEMOGLOBIN GLYCOSYLATED A1C: CPT

## 2022-10-12 PROCEDURE — 85025 COMPLETE CBC W/AUTO DIFF WBC: CPT

## 2022-10-12 PROCEDURE — 84443 ASSAY THYROID STIM HORMONE: CPT

## 2022-10-12 PROCEDURE — 36415 COLL VENOUS BLD VENIPUNCTURE: CPT

## 2022-10-12 PROCEDURE — 80053 COMPREHEN METABOLIC PANEL: CPT

## 2022-10-12 PROCEDURE — 80061 LIPID PANEL: CPT

## 2022-10-21 ENCOUNTER — OFFICE VISIT (OUTPATIENT)
Dept: FAMILY MEDICINE CLINIC | Facility: CLINIC | Age: 87
End: 2022-10-21
Payer: MEDICARE

## 2022-10-21 VITALS
TEMPERATURE: 98 F | SYSTOLIC BLOOD PRESSURE: 109 MMHG | WEIGHT: 179 LBS | BODY MASS INDEX: 26.82 KG/M2 | HEIGHT: 68.5 IN | HEART RATE: 75 BPM | DIASTOLIC BLOOD PRESSURE: 71 MMHG

## 2022-10-21 DIAGNOSIS — I63.9 CEREBROVASCULAR ACCIDENT (CVA), UNSPECIFIED MECHANISM (HCC): ICD-10-CM

## 2022-10-21 DIAGNOSIS — H25.13 AGE-RELATED NUCLEAR CATARACT OF BOTH EYES: Primary | ICD-10-CM

## 2022-10-21 DIAGNOSIS — Z00.00 ENCOUNTER FOR ANNUAL HEALTH EXAMINATION: ICD-10-CM

## 2022-10-21 DIAGNOSIS — E11.9 DIET-CONTROLLED DIABETES MELLITUS (HCC): ICD-10-CM

## 2022-10-21 DIAGNOSIS — H35.30 MACULAR DEGENERATION, UNSPECIFIED LATERALITY, UNSPECIFIED TYPE: ICD-10-CM

## 2022-10-21 RX ORDER — LISINOPRIL 10 MG/1
10 TABLET ORAL DAILY
Qty: 90 TABLET | Refills: 1 | Status: SHIPPED | OUTPATIENT
Start: 2022-10-21 | End: 2023-10-16

## 2022-10-22 RX ORDER — AMLODIPINE BESYLATE 5 MG/1
5 TABLET ORAL DAILY
Qty: 90 TABLET | Refills: 1 | Status: SHIPPED | OUTPATIENT
Start: 2022-10-22

## 2022-10-22 RX ORDER — ROSUVASTATIN CALCIUM 20 MG/1
20 TABLET, COATED ORAL NIGHTLY
Qty: 90 TABLET | Refills: 1 | Status: SHIPPED | OUTPATIENT
Start: 2022-10-22

## 2022-10-22 NOTE — TELEPHONE ENCOUNTER
Refill passed per [place] [employer] [facility] protocol.     Requested Prescriptions   Pending Prescriptions Disp Refills    ROSUVASTATIN 20 MG Oral Tab [Pharmacy Med Name: Rosuvastatin Calcium Oral Tablet 20 MG] 90 tablet 0     Sig: TAKE ONE TABLET BY MOUTH NIGHTLY        Cholesterol Medication Protocol Passed - 10/22/2022 11:26 AM        Passed - ALT in past 12 months        Passed - LDL in past 12 months        Passed - Last ALT < 80       Lab Results   Component Value Date    ALT 21 10/12/2022             Passed - Last LDL < 130     Lab Results   Component Value Date    LDL 66 10/12/2022               Passed - In person appointment or virtual visit in the past 12 mos or appointment in next 3 mos       Recent Outpatient Visits              Yesterday Age-related nuclear cataract of both eyes    Reid Sharif MD    Office Visit    6 months ago Essential hypertension    Reid Sharif MD    Office Visit    1 year ago Screening for colon cancer    Reid Sharif MD    Office Visit    1 year ago Preop testing    Reid Sharif MD    Office Visit    1 year ago Preoperative clearance    Reid Sharif MD    Office Visit                    AMLODIPINE 5 MG Oral Tab [place] Med Name: amLODIPine Besylate Oral Tablet 5 MG] 90 tablet 0     Sig: TAKE ONE TABLET BY MOUTH ONE TIME DAILY        Hypertensive Medications Protocol Passed - 10/22/2022 11:26 AM        Passed - In person appointment in the past 12 or next 3 months       Recent Outpatient Visits              Yesterday Age-related nuclear cataract of both eyes    Reid Sharif MD    Office Visit    6 months ago Essential hypertension    Reid Sharif MD    Office Visit    1 year ago Screening for colon cancer    Cecelia Palacios MD    Office Visit    1 year ago Preop testing    Cecelia Palacios MD    Office Visit    1 year ago Preoperative clearance    [facility] [address]    Office Visit                 Passed - Last BP reading less than 140/90     BP Readings from Last 1 Encounters:  10/21/22 : 109/71                Passed - CMP or BMP in past 6 months     Recent Results (from the past 4392 hour(s))   COMP METABOLIC PANEL (14)    Collection Time: 10/12/22  9:05 AM   Result Value Ref Range    Glucose 105 (H) 70 - 99 mg/dL    Sodium 140 136 - 145 mmol/L    Potassium 4.2 3.5 - 5.1 mmol/L    Chloride 109 98 - 112 mmol/L    CO2 25.0 21.0 - 32.0 mmol/L    Anion Gap 6 0 - 18 mmol/L    BUN 24 (H) 7 - 18 mg/dL    Creatinine 0.97 0.70 - 1.30 mg/dL    BUN/CREA Ratio 24.7 (H) 10.0 - 20.0    Calcium, Total 8.8 8.5 - 10.1 mg/dL    Calculated Osmolality 294 275 - 295 mOsm/kg    eGFR-Cr 77 >=60 mL/min/1.73m2    ALT 21 16 - 61 U/L    AST 17 15 - 37 U/L    Alkaline Phosphatase 54 45 - 117 U/L    Bilirubin, Total 0.6 0.1 - 2.0 mg/dL    Total Protein 6.9 6.4 - 8.2 g/dL    Albumin 3.4 3.4 - 5.0 g/dL    Globulin  3.5 2.8 - 4.4 g/dL    A/G Ratio 1.0 1.0 - 2.0    Patient Fasting for CMP? Yes      *Note: Due to a large number of results and/or encounters for the requested time period, some results have not been displayed. A complete set of results can be found in Results Review.                  Passed - In person appointment or virtual visit in the past 6 months       Recent Outpatient Visits              Yesterday Age-related nuclear cataract of both eyes    Cecelia Palacios MD    Office Visit    6 months ago Essential hypertension    Cecelia Palacios MD    Office Visit    1 year ago Screening for colon cancer Nanda Salvador MD    Office Visit    1 year ago Preop testing    Nanda Salvador MD    Office Visit    1 year ago Preoperative clearance    Nanda Salvador MD    Office Visit                 Passed - EGFRCR or GFRNAA > 50     GFR Evaluation  EGFRCR: 77 , resulted on 10/12/2022               CLOPIDOGREL 75 MG Oral Tab [Pharmacy Med Name: Clopidogrel Bisulfate Oral Tablet 75 MG] 90 tablet 0     Sig: TAKE ONE TABLET BY MOUTH ONE TIME DAILY        There is no refill protocol information for this order            Recent Outpatient Visits              Yesterday Age-related nuclear cataract of both eyes    Nanda Salvador MD    Office Visit    6 months ago Essential hypertension    Nanda Salvador MD    Office Visit    1 year ago Screening for colon cancer    Nanda Salvador MD    Office Visit    1 year ago Preop testing    Nanda Salvador MD    Office Visit    1 year ago Preoperative clearance    Nanda Salvador MD    Office Visit

## 2022-10-22 NOTE — TELEPHONE ENCOUNTER
Please review. Protocol failed/ No protocol. Requested Prescriptions   Pending Prescriptions Disp Refills    clopidogrel 75 MG Oral Tab [Pharmacy Med Name: Clopidogrel Bisulfate Oral Tablet 75 MG] 90 tablet 1     Sig: Take 1 tablet (75 mg total) by mouth daily. There is no refill protocol information for this order       Signed Prescriptions Disp Refills    rosuvastatin 20 MG Oral Tab 90 tablet 1     Sig: Take 1 tablet (20 mg total) by mouth nightly. Cholesterol Medication Protocol Passed - 10/22/2022 11:26 AM        Passed - ALT in past 12 months        Passed - LDL in past 12 months        Passed - Last ALT < 80       Lab Results   Component Value Date    ALT 21 10/12/2022             Passed - Last LDL < 130     Lab Results   Component Value Date    LDL 66 10/12/2022               Passed - In person appointment or virtual visit in the past 12 mos or appointment in next 3 mos       Recent Outpatient Visits              Yesterday Age-related nuclear cataract of both eyes    Yobany Godoy MD    Office Visit    6 months ago Essential hypertension    Yobany Godoy MD    Office Visit    1 year ago Screening for colon cancer    Yobany Godoy MD    Office Visit    1 year ago Preop testing    Yobany Godoy MD    Office Visit    1 year ago Preoperative clearance    Yobany Godoy MD    Office Visit                    amLODIPine 5 MG Oral Tab 90 tablet 1     Sig: Take 1 tablet (5 mg total) by mouth daily.         Hypertensive Medications Protocol Passed - 10/22/2022 11:26 AM        Passed - In person appointment in the past 12 or next 3 months       Recent Outpatient Visits              Yesterday Age-related nuclear cataract of both eyes    [address] [facility], [facility] Jenny Rowland MD    Office Visit    6 months ago Essential hypertension    Guy Wallace MD    Office Visit    1 year ago Screening for colon cancer    Guy Wallace MD    Office Visit    1 year ago Preop testing    Guy Wallace MD    Office Visit    1 year ago Preoperative clearance    [address], [address], Loreesa Alex MD    Office Visit                 Passed - Last BP reading less than 140/90     BP Readings from Last 1 Encounters:  10/21/22 : 109/71                Passed - CMP or BMP in past 6 months     Recent Results (from the past 4392 hour(s))   COMP METABOLIC PANEL (14)    Collection Time: 10/12/22  9:05 AM   Result Value Ref Range    Glucose 105 (H) 70 - 99 mg/dL    Sodium 140 136 - 145 mmol/L    Potassium 4.2 3.5 - 5.1 mmol/L    Chloride 109 98 - 112 mmol/L    CO2 25.0 21.0 - 32.0 mmol/L    Anion Gap 6 0 - 18 mmol/L    BUN 24 (H) 7 - 18 mg/dL    Creatinine 0.97 0.70 - 1.30 mg/dL    BUN/CREA Ratio 24.7 (H) 10.0 - 20.0    Calcium, Total 8.8 8.5 - 10.1 mg/dL    Calculated Osmolality 294 275 - 295 mOsm/kg    eGFR-Cr 77 >=60 mL/min/1.73m2    ALT 21 16 - 61 U/L    AST 17 15 - 37 U/L    Alkaline Phosphatase 54 45 - 117 U/L    Bilirubin, Total 0.6 0.1 - 2.0 mg/dL    Total Protein 6.9 6.4 - 8.2 g/dL    Albumin 3.4 3.4 - 5.0 g/dL    Globulin  3.5 2.8 - 4.4 g/dL    A/G Ratio 1.0 1.0 - 2.0    Patient Fasting for CMP? Yes      *Note: Due to a large number of results and/or encounters for the requested time period, some results have not been displayed. A complete set of results can be found in Results Review.                  Passed - In person appointment or virtual visit in the past 6 months       Recent Outpatient Visits              Yesterday Age-related nuclear cataract of both eyes    Guy Wallace MD    Office Visit    6 months ago Essential hypertension    Renata Carmen MD    Office Visit    1 year ago Screening for colon cancer    Renata Carmen MD    Office Visit    1 year ago Preop testing    Renata Carmen MD    Office Visit    1 year ago Preoperative clearance    Renata Carmen MD    Office Visit                 Passed - EGFRCR or GFRNAA > 50     GFR Evaluation  EGFRCR: 77 , resulted on 10/12/2022                  Recent Outpatient Visits              Yesterday Age-related nuclear cataract of both eyes    Renata Carmen MD    Office Visit    6 months ago Essential hypertension    Renata Carmen MD    Office Visit    1 year ago Screening for colon cancer    Renata Carmen MD    Office Visit    1 year ago Preop testing    Renata Carmen MD    Office Visit    1 year ago Preoperative clearance    Renata Carmen MD    Office Visit

## 2022-10-24 RX ORDER — CLOPIDOGREL BISULFATE 75 MG/1
75 TABLET ORAL DAILY
Qty: 90 TABLET | Refills: 1 | Status: SHIPPED | OUTPATIENT
Start: 2022-10-24

## 2022-12-08 ENCOUNTER — MED REC SCAN ONLY (OUTPATIENT)
Dept: FAMILY MEDICINE CLINIC | Facility: CLINIC | Age: 87
End: 2022-12-08

## 2022-12-08 ENCOUNTER — TELEPHONE (OUTPATIENT)
Dept: FAMILY MEDICINE CLINIC | Facility: CLINIC | Age: 87
End: 2022-12-08

## 2022-12-08 NOTE — TELEPHONE ENCOUNTER
[address] dental paperwork has been signed, faxed [phone] successfully and sent to scanning

## 2022-12-21 ENCOUNTER — TELEPHONE (OUTPATIENT)
Dept: FAMILY MEDICINE CLINIC | Facility: CLINIC | Age: 87
End: 2022-12-21

## 2023-01-04 ENCOUNTER — MED REC SCAN ONLY (OUTPATIENT)
Dept: FAMILY MEDICINE CLINIC | Facility: CLINIC | Age: 88
End: 2023-01-04

## 2023-01-10 ENCOUNTER — TELEPHONE (OUTPATIENT)
Dept: FAMILY MEDICINE CLINIC | Facility: CLINIC | Age: 88
End: 2023-01-10

## 2023-01-10 NOTE — TELEPHONE ENCOUNTER
Patient is calling to clarify when he should set up a follow up appointment with PCP per last annual visit on 10/24/22. Patient mentions there was a change in medication on 10/2022 and would like to know what the advise is for follow up appointment. Please advise.

## 2023-01-11 NOTE — TELEPHONE ENCOUNTER
Patient notified, appt made. Patient would like to know if you'd Like any labwork done ahead of time.  Please advise

## 2023-01-11 NOTE — TELEPHONE ENCOUNTER
Spoke to patient and relayed Dr. Adelina Adkins message below. Patient verbalized understanding and had no further questions.

## 2023-01-11 NOTE — TELEPHONE ENCOUNTER
Patient to f/u for change of medication 2/2023   Annual physica can wait until next October   Please call him

## 2023-01-17 ENCOUNTER — MED REC SCAN ONLY (OUTPATIENT)
Dept: FAMILY MEDICINE CLINIC | Facility: CLINIC | Age: 88
End: 2023-01-17

## 2023-02-08 ENCOUNTER — MED REC SCAN ONLY (OUTPATIENT)
Dept: FAMILY MEDICINE CLINIC | Facility: CLINIC | Age: 88
End: 2023-02-08

## 2023-02-14 ENCOUNTER — OFFICE VISIT (OUTPATIENT)
Dept: FAMILY MEDICINE CLINIC | Facility: CLINIC | Age: 88
End: 2023-02-14

## 2023-02-14 VITALS
BODY MASS INDEX: 27.42 KG/M2 | RESPIRATION RATE: 18 BRPM | HEART RATE: 79 BPM | WEIGHT: 183 LBS | OXYGEN SATURATION: 95 % | SYSTOLIC BLOOD PRESSURE: 140 MMHG | HEIGHT: 68.5 IN | DIASTOLIC BLOOD PRESSURE: 80 MMHG

## 2023-02-14 DIAGNOSIS — I10 ESSENTIAL HYPERTENSION: Primary | ICD-10-CM

## 2023-02-14 PROCEDURE — 1126F AMNT PAIN NOTED NONE PRSNT: CPT | Performed by: FAMILY MEDICINE

## 2023-02-14 PROCEDURE — 99213 OFFICE O/P EST LOW 20 MIN: CPT | Performed by: FAMILY MEDICINE

## 2023-02-14 RX ORDER — LISINOPRIL 20 MG/1
20 TABLET ORAL DAILY
Qty: 90 TABLET | Refills: 1 | Status: SHIPPED | OUTPATIENT
Start: 2023-02-14

## 2023-03-03 ENCOUNTER — MED REC SCAN ONLY (OUTPATIENT)
Dept: FAMILY MEDICINE CLINIC | Facility: CLINIC | Age: 88
End: 2023-03-03

## 2023-04-12 ENCOUNTER — MED REC SCAN ONLY (OUTPATIENT)
Dept: FAMILY MEDICINE CLINIC | Facility: CLINIC | Age: 88
End: 2023-04-12

## 2023-04-13 ENCOUNTER — TELEPHONE (OUTPATIENT)
Dept: FAMILY MEDICINE CLINIC | Facility: CLINIC | Age: 88
End: 2023-04-13

## 2023-04-14 NOTE — TELEPHONE ENCOUNTER
Spoke with pt,  verified  Pt was informed of MD recommendation, pt stated understanding.       Future Appointments   Date Time Provider Ezequiel Coles   2023  9:50 AM Dani Matthews MD [facility] [place]

## 2023-04-21 ENCOUNTER — LAB ENCOUNTER (OUTPATIENT)
Dept: LAB | Age: 88
End: 2023-04-21
Attending: ORTHOPAEDIC SURGERY
Payer: MEDICARE

## 2023-04-21 ENCOUNTER — OFFICE VISIT (OUTPATIENT)
Dept: FAMILY MEDICINE CLINIC | Facility: CLINIC | Age: 88
End: 2023-04-21

## 2023-04-21 VITALS
DIASTOLIC BLOOD PRESSURE: 70 MMHG | HEIGHT: 68.5 IN | SYSTOLIC BLOOD PRESSURE: 120 MMHG | BODY MASS INDEX: 26.97 KG/M2 | RESPIRATION RATE: 16 BRPM | OXYGEN SATURATION: 95 % | HEART RATE: 88 BPM | WEIGHT: 180 LBS

## 2023-04-21 DIAGNOSIS — M54.59 OTHER LOW BACK PAIN: ICD-10-CM

## 2023-04-21 DIAGNOSIS — R00.0 TACHYCARDIA: ICD-10-CM

## 2023-04-21 DIAGNOSIS — R73.03 PREDIABETES: Primary | ICD-10-CM

## 2023-04-21 LAB
CARTRIDGE LOT#: ABNORMAL NUMERIC
CREAT UR-SCNC: 97.3 MG/DL
HEMOGLOBIN A1C: 6.1 % (ref 4.3–5.6)
MICROALBUMIN UR-MCNC: 0.54 MG/DL
MICROALBUMIN/CREAT 24H UR-RTO: 5.5 UG/MG (ref ?–30)
Q-T INTERVAL: 346 MS
QRS DURATION: 92 MS
QTC CALCULATION (BEZET): 450 MS
R AXIS: 49 DEGREES
T AXIS: 68 DEGREES
VENTRICULAR RATE: 102 BPM

## 2023-04-21 PROCEDURE — 93005 ELECTROCARDIOGRAM TRACING: CPT

## 2023-04-21 PROCEDURE — 1126F AMNT PAIN NOTED NONE PRSNT: CPT | Performed by: FAMILY MEDICINE

## 2023-04-21 PROCEDURE — 93010 ELECTROCARDIOGRAM REPORT: CPT | Performed by: INTERNAL MEDICINE

## 2023-04-21 PROCEDURE — 99214 OFFICE O/P EST MOD 30 MIN: CPT | Performed by: FAMILY MEDICINE

## 2023-04-21 PROCEDURE — 83036 HEMOGLOBIN GLYCOSYLATED A1C: CPT | Performed by: FAMILY MEDICINE

## 2023-04-21 RX ORDER — LISINOPRIL 20 MG/1
20 TABLET ORAL DAILY
Qty: 90 TABLET | Refills: 1 | Status: SHIPPED | OUTPATIENT
Start: 2023-04-21

## 2023-04-21 RX ORDER — METOPROLOL SUCCINATE 50 MG/1
50 TABLET, EXTENDED RELEASE ORAL DAILY
Qty: 90 TABLET | Refills: 1 | Status: SHIPPED | OUTPATIENT
Start: 2023-04-21 | End: 2024-04-15

## 2023-04-23 RX ORDER — ROSUVASTATIN CALCIUM 20 MG/1
TABLET, COATED ORAL
Qty: 90 TABLET | Refills: 3 | Status: SHIPPED | OUTPATIENT
Start: 2023-04-23

## 2023-04-25 RX ORDER — CLOPIDOGREL BISULFATE 75 MG/1
TABLET ORAL
Qty: 90 TABLET | Refills: 3 | Status: SHIPPED | OUTPATIENT
Start: 2023-04-25

## 2023-04-27 ENCOUNTER — TELEPHONE (OUTPATIENT)
Dept: FAMILY MEDICINE CLINIC | Facility: CLINIC | Age: 88
End: 2023-04-27

## 2023-04-27 NOTE — TELEPHONE ENCOUNTER
Patient called states cardiologist called him and made an appointment tomorrow, but they need the results from EKG faxed over today for his appointment tomorrow. I made him aware I am faxing as we speak --> faxed via communications. Patient verbalized understanding. No further questions or concerns at this time.       Zuhair Carr, [address] [place] [address]  [id number] [place] [address] Loop [phone]     Fax# [phone]

## 2023-05-01 ENCOUNTER — LAB ENCOUNTER (OUTPATIENT)
Dept: LAB | Age: 88
End: 2023-05-01
Attending: INTERNAL MEDICINE
Payer: MEDICARE

## 2023-05-01 DIAGNOSIS — I48.0 PAROXYSMAL ATRIAL FIBRILLATION (HCC): ICD-10-CM

## 2023-05-01 DIAGNOSIS — I10 HYPERTENSION: ICD-10-CM

## 2023-05-01 DIAGNOSIS — E78.2 MIXED HYPERLIPIDEMIA: Primary | ICD-10-CM

## 2023-05-01 LAB
ALBUMIN SERPL-MCNC: 3.4 G/DL (ref 3.4–5)
ALBUMIN/GLOB SERPL: 1 {RATIO} (ref 1–2)
ALP LIVER SERPL-CCNC: 56 U/L
ALT SERPL-CCNC: 18 U/L
ANION GAP SERPL CALC-SCNC: 7 MMOL/L (ref 0–18)
AST SERPL-CCNC: 17 U/L (ref 15–37)
BASOPHILS # BLD AUTO: 0.08 X10(3) UL (ref 0–0.2)
BASOPHILS NFR BLD AUTO: 1.1 %
BILIRUB SERPL-MCNC: 0.5 MG/DL (ref 0.1–2)
BUN BLD-MCNC: 27 MG/DL (ref 7–18)
BUN/CREAT SERPL: 30 (ref 10–20)
CALCIUM BLD-MCNC: 8.9 MG/DL (ref 8.5–10.1)
CHLORIDE SERPL-SCNC: 109 MMOL/L (ref 98–112)
CHOLEST SERPL-MCNC: 118 MG/DL (ref ?–200)
CO2 SERPL-SCNC: 25 MMOL/L (ref 21–32)
CREAT BLD-MCNC: 0.9 MG/DL
DEPRECATED RDW RBC AUTO: 44.9 FL (ref 35.1–46.3)
EOSINOPHIL # BLD AUTO: 0.13 X10(3) UL (ref 0–0.7)
EOSINOPHIL NFR BLD AUTO: 1.8 %
ERYTHROCYTE [DISTWIDTH] IN BLOOD BY AUTOMATED COUNT: 13.3 % (ref 11–15)
FASTING PATIENT LIPID ANSWER: YES
FASTING STATUS PATIENT QL REPORTED: YES
GFR SERPLBLD BASED ON 1.73 SQ M-ARVRAT: 84 ML/MIN/1.73M2 (ref 60–?)
GLOBULIN PLAS-MCNC: 3.4 G/DL (ref 2.8–4.4)
GLUCOSE BLD-MCNC: 111 MG/DL (ref 70–99)
HCT VFR BLD AUTO: 43.7 %
HDLC SERPL-MCNC: 53 MG/DL (ref 40–59)
HGB BLD-MCNC: 14.2 G/DL
IMM GRANULOCYTES # BLD AUTO: 0.01 X10(3) UL (ref 0–1)
IMM GRANULOCYTES NFR BLD: 0.1 %
LDLC SERPL CALC-MCNC: 54 MG/DL (ref ?–100)
LYMPHOCYTES # BLD AUTO: 2.12 X10(3) UL (ref 1–4)
LYMPHOCYTES NFR BLD AUTO: 29 %
MCH RBC QN AUTO: 29.5 PG (ref 26–34)
MCHC RBC AUTO-ENTMCNC: 32.5 G/DL (ref 31–37)
MCV RBC AUTO: 90.7 FL
MONOCYTES # BLD AUTO: 0.71 X10(3) UL (ref 0.1–1)
MONOCYTES NFR BLD AUTO: 9.7 %
NEUTROPHILS # BLD AUTO: 4.25 X10 (3) UL (ref 1.5–7.7)
NEUTROPHILS # BLD AUTO: 4.25 X10(3) UL (ref 1.5–7.7)
NEUTROPHILS NFR BLD AUTO: 58.3 %
NONHDLC SERPL-MCNC: 65 MG/DL (ref ?–130)
OSMOLALITY SERPL CALC.SUM OF ELEC: 298 MOSM/KG (ref 275–295)
PLATELET # BLD AUTO: 129 10(3)UL (ref 150–450)
POTASSIUM SERPL-SCNC: 4.2 MMOL/L (ref 3.5–5.1)
PROT SERPL-MCNC: 6.8 G/DL (ref 6.4–8.2)
RBC # BLD AUTO: 4.82 X10(6)UL
SODIUM SERPL-SCNC: 141 MMOL/L (ref 136–145)
TRIGL SERPL-MCNC: 43 MG/DL (ref 30–149)
VLDLC SERPL CALC-MCNC: 6 MG/DL (ref 0–30)
WBC # BLD AUTO: 7.3 X10(3) UL (ref 4–11)

## 2023-05-01 PROCEDURE — 80053 COMPREHEN METABOLIC PANEL: CPT

## 2023-05-01 PROCEDURE — 85025 COMPLETE CBC W/AUTO DIFF WBC: CPT

## 2023-05-01 PROCEDURE — 80061 LIPID PANEL: CPT

## 2023-05-01 PROCEDURE — 36415 COLL VENOUS BLD VENIPUNCTURE: CPT

## 2023-05-23 ENCOUNTER — TELEPHONE (OUTPATIENT)
Dept: FAMILY MEDICINE CLINIC | Facility: CLINIC | Age: 88
End: 2023-05-23

## 2023-05-23 NOTE — TELEPHONE ENCOUNTER
Patient is asking if he needs lab work prior to scheduled medication follow up appointment with PCP scheduled on 5/30/23   Please advise.

## 2023-05-24 ENCOUNTER — MED REC SCAN ONLY (OUTPATIENT)
Dept: FAMILY MEDICINE CLINIC | Facility: CLINIC | Age: 88
End: 2023-05-24

## 2023-05-30 ENCOUNTER — OFFICE VISIT (OUTPATIENT)
Dept: FAMILY MEDICINE CLINIC | Facility: CLINIC | Age: 88
End: 2023-05-30
Payer: MEDICARE

## 2023-05-30 VITALS
RESPIRATION RATE: 18 BRPM | WEIGHT: 182 LBS | DIASTOLIC BLOOD PRESSURE: 80 MMHG | HEIGHT: 68.5 IN | BODY MASS INDEX: 27.27 KG/M2 | SYSTOLIC BLOOD PRESSURE: 120 MMHG | HEART RATE: 71 BPM | OXYGEN SATURATION: 93 %

## 2023-05-30 DIAGNOSIS — I10 ESSENTIAL HYPERTENSION: Primary | ICD-10-CM

## 2023-05-30 PROCEDURE — 1126F AMNT PAIN NOTED NONE PRSNT: CPT | Performed by: FAMILY MEDICINE

## 2023-05-30 PROCEDURE — 99213 OFFICE O/P EST LOW 20 MIN: CPT | Performed by: FAMILY MEDICINE

## 2023-05-30 RX ORDER — VIT A/VIT C/VIT E/ZINC/COPPER 7160-113
TABLET, DELAYED RELEASE (ENTERIC COATED) ORAL
COMMUNITY

## 2023-05-30 RX ORDER — MAGNESIUM OXIDE 400 MG (241.3 MG MAGNESIUM) TABLET
800 TABLET DAILY
COMMUNITY

## 2023-06-09 ENCOUNTER — TELEPHONE (OUTPATIENT)
Dept: FAMILY MEDICINE CLINIC | Facility: CLINIC | Age: 88
End: 2023-06-09

## 2023-06-09 NOTE — TELEPHONE ENCOUNTER
Pre op Form and med list faxed/confirmed to Dr Olimpia Fraga 19 R:[phone] S:[phone]. Surgery date 6/28, held at nurses [facility].

## 2023-07-06 DIAGNOSIS — I10 ESSENTIAL HYPERTENSION: Primary | ICD-10-CM

## 2023-07-06 RX ORDER — LISINOPRIL 20 MG/1
20 TABLET ORAL DAILY
Qty: 90 TABLET | Refills: 3 | Status: SHIPPED | OUTPATIENT
Start: 2023-07-06

## 2023-07-06 NOTE — TELEPHONE ENCOUNTER
Refill passed per [employer] protocol    Requested Prescriptions   Pending Prescriptions Disp Refills    LISINOPRIL 20 MG Oral Tab [Pharmacy Med Name: Lisinopril Oral Tablet 20 MG] 90 tablet 0     Sig: TAKE ONE TABLET BY MOUTH ONE TIME DAILY       Hypertensive Medications Protocol Passed - 7/6/2023  1:44 PM        Passed - In person appointment in the past 12 or next 3 months     Recent Outpatient Visits              1 month ago Essential hypertension    [address] MD    Office Visit    2 months ago Prediabetes    [address] MD    Office Visit    4 months ago Essential hypertension    [address] MD    Office Visit    8 months ago Age-related nuclear cataract of both eyes    [address] MD    Office Visit    1 year ago Essential hypertension    [address] MD    Office Visit                      Passed - Last BP reading less than 140/90     BP Readings from Last 1 Encounters:  05/30/23 : 120/80              Passed - CMP or BMP in past 6 months     Recent Results (from the past 4392 hour(s))   COMP METABOLIC PANEL (14)    Collection Time: 05/01/23  8:49 AM   Result Value Ref Range    Glucose 111 (H) 70 - 99 mg/dL    Sodium 141 136 - 145 mmol/L    Potassium 4.2 3.5 - 5.1 mmol/L    Chloride 109 98 - 112 mmol/L    CO2 25.0 21.0 - 32.0 mmol/L    Anion Gap 7 0 - 18 mmol/L    BUN 27 (H) 7 - 18 mg/dL    Creatinine 0.90 0.70 - 1.30 mg/dL    BUN/CREA Ratio 30.0 (H) 10.0 - 20.0    Calcium, Total 8.9 8.5 - 10.1 mg/dL    Calculated Osmolality 298 (H) 275 - 295 mOsm/kg    eGFR-Cr 84 >=60 mL/min/1.73m2    ALT 18 16 - 61 U/L    AST 17 15 - 37 U/L    Alkaline Phosphatase 56 45 - 117 U/L    Bilirubin, Total 0.5 0.1 - 2.0 mg/dL Total Protein 6.8 6.4 - 8.2 g/dL    Albumin 3.4 3.4 - 5.0 g/dL    Globulin  3.4 2.8 - 4.4 g/dL    A/G Ratio 1.0 1.0 - 2.0    Patient Fasting for CMP? Yes      *Note: Due to a large number of results and/or encounters for the requested time period, some results have not been displayed. A complete set of results can be found in Results Review.                Passed - In person appointment or virtual visit in the past 6 months     Recent Outpatient Visits              1 month ago Essential hypertension    [facility] MD    Office Visit    2 months ago Prediabetes    [facility] MD    Office Visit    4 months ago Essential hypertension    [facility] MD    Office Visit    8 months ago Age-related nuclear cataract of both eyes    Ute Villarreal MD    Office Visit    1 year ago Essential hypertension    Ute Villarreal MD    Office Visit                      Passed - EGFRCR or GFRNAA > 50     GFR Evaluation  EGFRCR: 84 , resulted on 5/1/2023                   Recent Outpatient Visits              1 month ago Essential hypertension    Ute Villarreal MD    Office Visit    2 months ago Prediabetes    Ute Villarreal MD    Office Visit    4 months ago Essential hypertension    Ute Villarreal MD    Office Visit    8 months ago Age-related nuclear cataract of both eyes    Ute Villarreal MD    Office Visit    1 year ago Essential hypertension    Ute Villarreal MD Office Visit

## 2023-07-17 ENCOUNTER — TELEPHONE (OUTPATIENT)
Dept: FAMILY MEDICINE CLINIC | Facility: CLINIC | Age: 88
End: 2023-07-17

## 2023-07-17 NOTE — TELEPHONE ENCOUNTER
Eye exam results received from eye physicians. Reports placed in Dr. Elizabeth Garcia folder for review.

## 2023-07-28 ENCOUNTER — MED REC SCAN ONLY (OUTPATIENT)
Dept: FAMILY MEDICINE CLINIC | Facility: CLINIC | Age: 88
End: 2023-07-28

## 2023-09-02 ENCOUNTER — TELEPHONE (OUTPATIENT)
Dept: FAMILY MEDICINE CLINIC | Facility: CLINIC | Age: 88
End: 2023-09-02

## 2023-09-02 PROBLEM — Z86.73 HISTORY OF CVA (CEREBROVASCULAR ACCIDENT): Status: ACTIVE | Noted: 2023-09-02

## 2023-09-02 PROBLEM — Z79.01 ANTICOAGULATED: Status: ACTIVE | Noted: 2023-09-02

## 2023-09-02 PROBLEM — I48.0 PAROXYSMAL ATRIAL FIBRILLATION (HCC): Status: ACTIVE | Noted: 2023-09-02

## 2023-09-02 PROBLEM — Z86.73 HISTORY OF TIA (TRANSIENT ISCHEMIC ATTACK): Status: ACTIVE | Noted: 2023-09-02

## 2023-09-06 ENCOUNTER — MED REC SCAN ONLY (OUTPATIENT)
Dept: FAMILY MEDICINE CLINIC | Facility: CLINIC | Age: 88
End: 2023-09-06

## 2023-10-17 RX ORDER — METOPROLOL SUCCINATE 50 MG/1
50 TABLET, EXTENDED RELEASE ORAL DAILY
Qty: 90 TABLET | Refills: 3 | Status: SHIPPED | OUTPATIENT
Start: 2023-10-17

## 2023-10-17 NOTE — TELEPHONE ENCOUNTER
Refill passed per [employer] protocol.   Requested Prescriptions   Pending Prescriptions Disp Refills    METOPROLOL SUCCINATE ER 50 MG Oral Tablet 24 Hr [Pharmacy Med Name: Metoprolol Succinate ER Oral Tablet Extended Release 24 Hour 50 MG] 90 tablet 0     Sig: TAKE ONE TABLET BY MOUTH ONE TIME DAILY       Hypertensive Medications Protocol Passed - 10/16/2023 12:39 PM        Passed - In person appointment in the past 12 or next 3 months     Recent Outpatient Visits              1 month ago Lab test positive for detection of COVID-19 virus    Lucille Cisneros MD    Telemedicine    4 months ago Essential hypertension    Annetta Diaz MD    Office Visit    5 months ago Prediabetes    Hernan Inman MD    Office Visit    8 months ago Essential hypertension    Annetta Diaz MD    Office Visit    12 months ago Age-related nuclear cataract of both eyes    Hernan Inman MD    Office Visit          Future Appointments         Provider Department Appt Notes    In 2 weeks MD [facility] [place] last px 55/81/66 Policy advised  labs                      Passed - Last BP reading less than 140/90     BP Readings from Last 1 Encounters:  05/30/23 : 120/80              Passed - CMP or BMP in past 6 months     Recent Results (from the past 4392 hour(s))   Comp Metabolic Panel (14)    Collection Time: 05/01/23  8:49 AM   Result Value Ref Range    Glucose 111 (H) 70 - 99 mg/dL    Sodium 141 136 - 145 mmol/L    Potassium 4.2 3.5 - 5.1 mmol/L    Chloride 109 98 - 112 mmol/L    CO2 25.0 21.0 - 32.0 mmol/L    Anion Gap 7 0 - 18 mmol/L    BUN 27 (H) 7 - 18 mg/dL    Creatinine 0.90 0.70 - 1.30 mg/dL BUN/CREA Ratio 30.0 (H) 10.0 - 20.0    Calcium, Total 8.9 8.5 - 10.1 mg/dL    Calculated Osmolality 298 (H) 275 - 295 mOsm/kg    eGFR-Cr 84 >=60 mL/min/1.73m2    ALT 18 16 - 61 U/L    AST 17 15 - 37 U/L    Alkaline Phosphatase 56 45 - 117 U/L    Bilirubin, Total 0.5 0.1 - 2.0 mg/dL    Total Protein 6.8 6.4 - 8.2 g/dL    Albumin 3.4 3.4 - 5.0 g/dL    Globulin  3.4 2.8 - 4.4 g/dL    A/G Ratio 1.0 1.0 - 2.0    Patient Fasting for CMP? Yes      *Note: Due to a large number of results and/or encounters for the requested time period, some results have not been displayed. A complete set of results can be found in Results Review.                Passed - In person appointment or virtual visit in the past 6 months     Recent Outpatient Visits              1 month ago Lab test positive for detection of COVID-19 virus    Rose Macdonald MD    Telemedicine    4 months ago Essential hypertension    Nelma Eisenmenger, Donnice Snipes, MD    Office Visit    5 months ago Prediabetes    Nelma Eisenmenger, Donnice Snipes, MD    Office Visit    8 months ago Essential hypertension    Nelma Eisenmenger, Donnice Snipes, MD    Office Visit    12 months ago Age-related nuclear cataract of both eyes    Virginia Gutierres MD    Office Visit          Future Appointments         Provider Department Appt Notes    In 2 weeks Chinyere Rosenthal MD [address] Policy advised  labs                      Passed - EGFRCR or GFRNAA > 50     GFR Evaluation  EGFRCR: 84 , resulted on 5/1/2023             Recent Outpatient Visits              1 month ago Lab test positive for detection of COVID-19 virus    Nelma Eisenmenger, Davy Nab, MD Telemedicine    4 months ago Essential hypertension    Stefany Howard MD    Office Visit    5 months ago Prediabetes    Stefany Howard MD    Office Visit    8 months ago Essential hypertension    Stefany Howard MD    Office Visit    12 months ago Age-related nuclear cataract of both eyes    Stefany Howard MD    Office Visit          Future Appointments         Provider Department Appt Notes    In 2 weeks Amna Cesar MD [address], [facility] last px 16/82/45 Policy advised  labs

## 2023-10-20 ENCOUNTER — MED REC SCAN ONLY (OUTPATIENT)
Dept: FAMILY MEDICINE CLINIC | Facility: CLINIC | Age: 88
End: 2023-10-20

## 2023-10-31 ENCOUNTER — OFFICE VISIT (OUTPATIENT)
Dept: FAMILY MEDICINE CLINIC | Facility: CLINIC | Age: 88
End: 2023-10-31

## 2023-10-31 VITALS
RESPIRATION RATE: 16 BRPM | HEART RATE: 71 BPM | OXYGEN SATURATION: 96 % | BODY MASS INDEX: 27.15 KG/M2 | WEIGHT: 181.19 LBS | DIASTOLIC BLOOD PRESSURE: 77 MMHG | HEIGHT: 68.5 IN | SYSTOLIC BLOOD PRESSURE: 122 MMHG

## 2023-10-31 DIAGNOSIS — Z86.73 HISTORY OF TIA (TRANSIENT ISCHEMIC ATTACK): ICD-10-CM

## 2023-10-31 DIAGNOSIS — D69.6 PLATELETS DECREASED (HCC): ICD-10-CM

## 2023-10-31 DIAGNOSIS — Z00.00 ENCOUNTER FOR ANNUAL HEALTH EXAMINATION: ICD-10-CM

## 2023-10-31 DIAGNOSIS — R73.03 PREDIABETES: ICD-10-CM

## 2023-10-31 DIAGNOSIS — I48.0 PAROXYSMAL ATRIAL FIBRILLATION (HCC): Primary | ICD-10-CM

## 2023-10-31 DIAGNOSIS — M47.816 LUMBAR SPONDYLOSIS: ICD-10-CM

## 2023-10-31 DIAGNOSIS — M54.59 OTHER LOW BACK PAIN: ICD-10-CM

## 2023-10-31 DIAGNOSIS — H35.30 MACULAR DEGENERATION, UNSPECIFIED LATERALITY, UNSPECIFIED TYPE: ICD-10-CM

## 2023-10-31 PROCEDURE — G0439 PPPS, SUBSEQ VISIT: HCPCS | Performed by: FAMILY MEDICINE

## 2023-10-31 PROCEDURE — 90662 IIV NO PRSV INCREASED AG IM: CPT | Performed by: FAMILY MEDICINE

## 2023-10-31 PROCEDURE — 1125F AMNT PAIN NOTED PAIN PRSNT: CPT | Performed by: FAMILY MEDICINE

## 2023-10-31 PROCEDURE — G0008 ADMIN INFLUENZA VIRUS VAC: HCPCS | Performed by: FAMILY MEDICINE

## 2023-10-31 PROCEDURE — 99213 OFFICE O/P EST LOW 20 MIN: CPT | Performed by: FAMILY MEDICINE

## 2023-12-06 ENCOUNTER — MED REC SCAN ONLY (OUTPATIENT)
Dept: FAMILY MEDICINE CLINIC | Facility: CLINIC | Age: 88
End: 2023-12-06

## 2023-12-22 ENCOUNTER — PATIENT OUTREACH (OUTPATIENT)
Dept: CASE MANAGEMENT | Age: 88
End: 2023-12-22

## 2023-12-22 ENCOUNTER — TELEPHONE (OUTPATIENT)
Dept: FAMILY MEDICINE CLINIC | Facility: CLINIC | Age: 88
End: 2023-12-22

## 2023-12-22 NOTE — PROCEDURES
The office order for Diabetes registry removal request is Approved and finalized on December 22, 2023.     Thanks,  [facility] [employer]

## 2024-03-05 ENCOUNTER — MED REC SCAN ONLY (OUTPATIENT)
Dept: FAMILY MEDICINE CLINIC | Facility: CLINIC | Age: 89
End: 2024-03-05

## 2024-04-19 NOTE — TELEPHONE ENCOUNTER
Pt has appt on 4/21/23 and is looking for labs to get down. pt also requests phone call once in and ready.  Please advise Refill Protocol Criteria Failed Due to:    BP uncontrolled.  No Refill Protocol on File:    Medication/Dose: phentermine  Patient last seen by PCP: 2024  Next office visit with PCP: none scheduled   Last Lab:2024  Last Ordered: 2024    Above information requires contacting patient: No    PDMP needs to be reviewed by Provider  Phentermine HCl  30MG / Capsule  Rx# [id number] Other Qty: 30  Days: 30  Refills: 0 Prescribed: 3/20/2024  Dispensed: 3/24/2024  [id number]  [address] [facility] [id number] [address]  : 1961 [address] [place] [id number]  Pay Type: Private Pay       Sent to provider to approve or deny

## 2024-05-02 DIAGNOSIS — E78.5 HYPERLIPIDEMIA, UNSPECIFIED HYPERLIPIDEMIA TYPE: ICD-10-CM

## 2024-05-02 DIAGNOSIS — R73.03 PREDIABETES: Primary | ICD-10-CM

## 2024-05-03 RX ORDER — ROSUVASTATIN CALCIUM 20 MG/1
20 TABLET, COATED ORAL NIGHTLY
Qty: 90 TABLET | Refills: 1 | Status: SHIPPED | OUTPATIENT
Start: 2024-05-03

## 2024-05-03 NOTE — TELEPHONE ENCOUNTER
Please Review. Protocol Failed; No Protocol   No Active/ Future labs pended    Requested Prescriptions   Pending Prescriptions Disp Refills    ROSUVASTATIN 20 MG Oral Tab [Pharmacy Med Name: Rosuvastatin Calcium Oral Tablet 20 MG] 90 tablet 0     Sig: TAKE ONE TABLET BY MOUTH NIGHTLY       Cholesterol Medication Protocol Failed - 5/2/2024 10:09 AM        Failed - ALT < 80     Lab Results   Component Value Date    ALT 18 05/01/2023             Failed - ALT resulted within past year        Failed - Lipid panel within past 12 months     Lab Results   Component Value Date    CHOLEST 118 05/01/2023    TRIG 43 05/01/2023    HDL 53 05/01/2023    LDL 54 05/01/2023    VLDL 6 05/01/2023    NONHDLC 65 05/01/2023             Passed - In person appointment or virtual visit in the past 12 mos or appointment in next 3 mos     Recent Outpatient Visits              6 months ago [facility]    8 months ago Lab test positive for detection of COVID-19 virus    [facility]    [facility]    11 months ago [facility] hypertension    [facility]    [facility]    1 year ago Prediabetes    [facility]    [facility]    1 year ago [facility]ential hypertension    [facility]          Future Appointments         Provider Department Appt Notes    In 3 weeks Ariana Corral MD [facility] Meenakshi 6 month follow up                           Future Appointments         Provider Department Appt Notes    In 3 weeks Ariana Corral MD [facility] Meenakshi 6 month follow up          Recent Outpatient Visits               6 months ago Paroxysmal atrial fibrillation (HCC)    [facility], Ariana Vidal MD    Office Visit    8 months ago Lab test positive for detection of COVID-19 virus    [facility], Raiza Lynch MD    Telemedicine    11 months ago Essential hypertension    [facility], Ariana Vidal MD    Office Visit    1 year ago Prediabetes    [facility], Ariana Vidal MD    Office Visit    1 year ago Essential hypertension    [facility], Ariana Vidal MD    Office Visit

## 2024-05-10 ENCOUNTER — OFFICE VISIT (OUTPATIENT)
Dept: FAMILY MEDICINE CLINIC | Facility: CLINIC | Age: 89
End: 2024-05-10

## 2024-05-10 ENCOUNTER — NURSE TRIAGE (OUTPATIENT)
Dept: FAMILY MEDICINE CLINIC | Facility: CLINIC | Age: 89
End: 2024-05-10

## 2024-05-10 VITALS
DIASTOLIC BLOOD PRESSURE: 66 MMHG | SYSTOLIC BLOOD PRESSURE: 108 MMHG | HEIGHT: 68.5 IN | WEIGHT: 180.81 LBS | BODY MASS INDEX: 27.09 KG/M2 | RESPIRATION RATE: 16 BRPM | HEART RATE: 82 BPM | OXYGEN SATURATION: 97 %

## 2024-05-10 DIAGNOSIS — R05.1 ACUTE COUGH: Primary | ICD-10-CM

## 2024-05-10 PROCEDURE — 99213 OFFICE O/P EST LOW 20 MIN: CPT | Performed by: FAMILY MEDICINE

## 2024-05-10 RX ORDER — DOXYCYCLINE HYCLATE 100 MG/1
100 CAPSULE ORAL 2 TIMES DAILY
Qty: 14 CAPSULE | Refills: 0 | Status: SHIPPED | OUTPATIENT
Start: 2024-05-10 | End: 2024-05-17

## 2024-05-10 NOTE — PROGRESS NOTES
Subjective:   Patient ID: Ivonne Martin is a 88 year old male.    HPI  Patient is here for cough for 5-6 days   It is productive with yellow phlegm   It was worse at night but he denies any shortness of breath   No fever   It is slightly better now   History/Other:   Review of Systems    Constitutional: Negative.  Negative for activity change, appetite change, diaphoresis and fatigue.     Respiratory: see hpi   Cardiovascular: Negative.  Negative for chest pain, palpitations and leg swelling.   Gastrointestinal: Negative.  Negative for abdominal pain.   Skin: Negative.           Psychiatric/Behavioral: Negative.        Current Outpatient Medications   Medication Sig Dispense Refill    doxycycline 100 MG Oral Cap Take 1 capsule (100 mg total) by mouth 2 (two) times daily for 7 days. For infection. 14 capsule 0    rosuvastatin 20 MG Oral Tab Take 1 tablet (20 mg total) by mouth nightly. 90 tablet 1    apixaban (ELIQUIS) 5 MG Oral Tab Eliquis 5 mg tablet, [RxNorm: 1722017] 90 tablet 1    metoprolol succinate ER 50 MG Oral Tablet 24 Hr Take 1 tablet (50 mg total) by mouth daily. 90 tablet 3    lisinopril 20 MG Oral Tab Take 1 tablet (20 mg total) by mouth daily. 90 tablet 3    folic acid 800 MCG Oral Tab Take 1 tablet (800 mcg total) by mouth daily.      Multiple Vitamins-Minerals (ICAPS AREDS FORMULA) Oral Tab Take by mouth.      Vitamin B-12 500 MCG Oral Tab Take 1 tablet (500 mcg total) by mouth daily. 90 tablet 3     Allergies:No Known Allergies    Objective:   Physical Exam  Constitutional:       Appearance: Normal appearance.   HENT:      Right Ear: Tympanic membrane normal.   Cardiovascular:      Rate and Rhythm: Normal rate and regular rhythm.      Pulses: Normal pulses.      Heart sounds: Normal heart sounds.   Pulmonary:      Breath sounds: No stridor. No rhonchi.      Comments: Prolonged exp phase  Chest:      Chest wall: No tenderness.   Neurological:      Mental Status: He is alert.         Assessment &  Plan:       ICD-10-CM    1. Acute cough  R05.1       Patient has bronchitis   It is slightly better today   Advised to wait for 1-2 days and if not better start doxy     No orders of the defined types were placed in this encounter.      Meds This Visit:  Requested Prescriptions     Signed Prescriptions Disp Refills    doxycycline 100 MG Oral Cap 14 capsule 0     Sig: Take 1 capsule (100 mg total) by mouth 2 (two) times daily for 7 days. For infection.       Imaging & Referrals:  None

## 2024-05-10 NOTE — TELEPHONE ENCOUNTER
Action Requested: Summary for Provider     []  Critical Lab, Recommendations Needed  [x] Need Additional Advice  []   FYI    []   Need Orders  [] Need Medications Sent to Pharmacy  []  Other     SUMMARY: Patient asking if  can see him today. No available appointment. Patient declined to see another provider.    Patient states for past 3 days he has body aches , nasal ccongestion, chest congestion, and a cough with yellowish mucous. No fever or shortness of breath. Patient did not take a home covid test.    Patient was offered an appointment today with another provider, but decline.    Reason for call: Cough  Onset: 3 cays    Reason for Disposition   Wheezing is present    Protocols used: Cough-A-OH

## 2024-05-25 ENCOUNTER — LAB ENCOUNTER (OUTPATIENT)
Dept: LAB | Age: 89
End: 2024-05-25
Attending: FAMILY MEDICINE

## 2024-05-25 DIAGNOSIS — R73.03 PREDIABETES: ICD-10-CM

## 2024-05-25 DIAGNOSIS — E78.5 HYPERLIPIDEMIA, UNSPECIFIED HYPERLIPIDEMIA TYPE: ICD-10-CM

## 2024-05-25 LAB
ALBUMIN SERPL-MCNC: 3.9 G/DL (ref 3.2–4.8)
ALBUMIN/GLOB SERPL: 1.4 {RATIO} (ref 1–2)
ALP LIVER SERPL-CCNC: 53 U/L
ALT SERPL-CCNC: 13 U/L
ANION GAP SERPL CALC-SCNC: 6 MMOL/L (ref 0–18)
AST SERPL-CCNC: 19 U/L (ref ?–34)
BASOPHILS # BLD AUTO: 0.06 X10(3) UL (ref 0–0.2)
BASOPHILS NFR BLD AUTO: 0.9 %
BILIRUB SERPL-MCNC: 0.8 MG/DL (ref 0.2–1.1)
BUN BLD-MCNC: 13 MG/DL (ref 9–23)
BUN/CREAT SERPL: 14.8 (ref 10–20)
CALCIUM BLD-MCNC: 8.7 MG/DL (ref 8.7–10.4)
CHLORIDE SERPL-SCNC: 109 MMOL/L (ref 98–112)
CHOLEST SERPL-MCNC: 143 MG/DL (ref ?–200)
CO2 SERPL-SCNC: 28 MMOL/L (ref 21–32)
CREAT BLD-MCNC: 0.88 MG/DL
CREAT UR-SCNC: 158.1 MG/DL
DEPRECATED RDW RBC AUTO: 43.3 FL (ref 35.1–46.3)
EGFRCR SERPLBLD CKD-EPI 2021: 84 ML/MIN/1.73M2 (ref 60–?)
EOSINOPHIL # BLD AUTO: 0.15 X10(3) UL (ref 0–0.7)
EOSINOPHIL NFR BLD AUTO: 2.1 %
ERYTHROCYTE [DISTWIDTH] IN BLOOD BY AUTOMATED COUNT: 13.2 % (ref 11–15)
EST. AVERAGE GLUCOSE BLD GHB EST-MCNC: 131 MG/DL (ref 68–126)
FASTING PATIENT LIPID ANSWER: YES
FASTING STATUS PATIENT QL REPORTED: YES
GLOBULIN PLAS-MCNC: 2.8 G/DL (ref 2–3.5)
GLUCOSE BLD-MCNC: 97 MG/DL (ref 70–99)
HBA1C MFR BLD: 6.2 % (ref ?–5.7)
HCT VFR BLD AUTO: 41.6 %
HDLC SERPL-MCNC: 42 MG/DL (ref 40–59)
HGB BLD-MCNC: 14.1 G/DL
IMM GRANULOCYTES # BLD AUTO: 0.02 X10(3) UL (ref 0–1)
IMM GRANULOCYTES NFR BLD: 0.3 %
LDLC SERPL CALC-MCNC: 87 MG/DL (ref ?–100)
LYMPHOCYTES # BLD AUTO: 2.57 X10(3) UL (ref 1–4)
LYMPHOCYTES NFR BLD AUTO: 36.8 %
MCH RBC QN AUTO: 30.6 PG (ref 26–34)
MCHC RBC AUTO-ENTMCNC: 33.9 G/DL (ref 31–37)
MCV RBC AUTO: 90.2 FL
MICROALBUMIN UR-MCNC: 0.5 MG/DL
MICROALBUMIN/CREAT 24H UR-RTO: 3.2 UG/MG (ref ?–30)
MONOCYTES # BLD AUTO: 0.66 X10(3) UL (ref 0.1–1)
MONOCYTES NFR BLD AUTO: 9.4 %
NEUTROPHILS # BLD AUTO: 3.53 X10 (3) UL (ref 1.5–7.7)
NEUTROPHILS # BLD AUTO: 3.53 X10(3) UL (ref 1.5–7.7)
NEUTROPHILS NFR BLD AUTO: 50.5 %
NONHDLC SERPL-MCNC: 101 MG/DL (ref ?–130)
OSMOLALITY SERPL CALC.SUM OF ELEC: 296 MOSM/KG (ref 275–295)
PLATELET # BLD AUTO: 173 10(3)UL (ref 150–450)
POTASSIUM SERPL-SCNC: 4.1 MMOL/L (ref 3.5–5.1)
PROT SERPL-MCNC: 6.7 G/DL (ref 5.7–8.2)
RBC # BLD AUTO: 4.61 X10(6)UL
SODIUM SERPL-SCNC: 143 MMOL/L (ref 136–145)
TRIGL SERPL-MCNC: 73 MG/DL (ref 30–149)
VLDLC SERPL CALC-MCNC: 12 MG/DL (ref 0–30)
WBC # BLD AUTO: 7 X10(3) UL (ref 4–11)

## 2024-05-25 PROCEDURE — 82043 UR ALBUMIN QUANTITATIVE: CPT

## 2024-05-25 PROCEDURE — 83036 HEMOGLOBIN GLYCOSYLATED A1C: CPT

## 2024-05-25 PROCEDURE — 80061 LIPID PANEL: CPT

## 2024-05-25 PROCEDURE — 80053 COMPREHEN METABOLIC PANEL: CPT

## 2024-05-25 PROCEDURE — 36415 COLL VENOUS BLD VENIPUNCTURE: CPT

## 2024-05-25 PROCEDURE — 85025 COMPLETE CBC W/AUTO DIFF WBC: CPT

## 2024-05-25 PROCEDURE — 82570 ASSAY OF URINE CREATININE: CPT

## 2024-05-29 ENCOUNTER — OFFICE VISIT (OUTPATIENT)
Dept: FAMILY MEDICINE CLINIC | Facility: CLINIC | Age: 89
End: 2024-05-29

## 2024-05-29 ENCOUNTER — TELEPHONE (OUTPATIENT)
Dept: FAMILY MEDICINE CLINIC | Facility: CLINIC | Age: 89
End: 2024-05-29

## 2024-05-29 ENCOUNTER — MED REC SCAN ONLY (OUTPATIENT)
Dept: FAMILY MEDICINE CLINIC | Facility: CLINIC | Age: 89
End: 2024-05-29

## 2024-05-29 VITALS
HEART RATE: 75 BPM | RESPIRATION RATE: 14 BRPM | WEIGHT: 183 LBS | HEIGHT: 68.5 IN | OXYGEN SATURATION: 98 % | SYSTOLIC BLOOD PRESSURE: 134 MMHG | DIASTOLIC BLOOD PRESSURE: 76 MMHG | BODY MASS INDEX: 27.42 KG/M2

## 2024-05-29 DIAGNOSIS — R73.03 PREDIABETES: ICD-10-CM

## 2024-05-29 DIAGNOSIS — R05.9 COUGH, UNSPECIFIED TYPE: Primary | ICD-10-CM

## 2024-05-29 PROCEDURE — 99214 OFFICE O/P EST MOD 30 MIN: CPT | Performed by: FAMILY MEDICINE

## 2024-05-29 RX ORDER — ROSUVASTATIN CALCIUM 20 MG/1
20 TABLET, COATED ORAL NIGHTLY
Qty: 90 TABLET | Refills: 1 | Status: SHIPPED | OUTPATIENT
Start: 2024-05-29

## 2024-05-29 NOTE — PROGRESS NOTES
Subjective:   Patient ID: Ivonne Martin is a 88 year old male.    HPI  Patient here for f/u cough   Doing much better barely has any cough no shortness of breath either   Blood test normal prediabetes controlled   History/Other:   Review of Systems    Constitutional: Negative.  Negative for activity change, appetite change, diaphoresis and fatigue.     Respiratory: Negative.  Negative for apnea, cough, chest tightness and shortness of breath.    Cardiovascular: Negative.  Negative for chest pain, palpitations and leg swelling.   Gastrointestinal: Negative.  Negative for abdominal pain.   Skin: Negative.             Current Outpatient Medications   Medication Sig Dispense Refill    rosuvastatin 20 MG Oral Tab Take 1 tablet (20 mg total) by mouth nightly. 90 tablet 1    apixaban (ELIQUIS) 5 MG Oral Tab Eliquis 5 mg tablet, [RxNorm: 1729450] 90 tablet 1    metoprolol succinate ER 50 MG Oral Tablet 24 Hr Take 1 tablet (50 mg total) by mouth daily. 90 tablet 3    lisinopril 20 MG Oral Tab Take 1 tablet (20 mg total) by mouth daily. 90 tablet 3    folic acid 800 MCG Oral Tab Take 1 tablet (800 mcg total) by mouth daily.      Multiple Vitamins-Minerals (ICAPS AREDS FORMULA) Oral Tab Take by mouth.      Vitamin B-12 500 MCG Oral Tab Take 1 tablet (500 mcg total) by mouth daily. 90 tablet 3     Allergies:No Known Allergies    Objective:   Physical Exam  Constitutional:       Appearance: He is well-developed.   Cardiovascular:      Rate and Rhythm: Normal rate and regular rhythm.      Heart sounds: Normal heart sounds.   Pulmonary:      Effort: Pulmonary effort is normal.      Breath sounds: Normal breath sounds.   Neurological:      Mental Status: He is alert.      Deep Tendon Reflexes: Reflexes are normal and symmetric.         Assessment & Plan:       ICD-10-CM    1. Cough, unspecified type  R05.9    Almost resolved   If any worsening to call me    2. Prediabetes  R73.03    Stable cpm       No orders of the defined  types were placed in this encounter.      Meds This Visit:  Requested Prescriptions     Signed Prescriptions Disp Refills    rosuvastatin 20 MG Oral Tab 90 tablet 1     Sig: Take 1 tablet (20 mg total) by mouth nightly.       Imaging & Referrals:  None

## 2024-05-29 NOTE — TELEPHONE ENCOUNTER
Eye exam from [facility] has been placed in green folder for review and to be sent to scanning

## 2024-06-07 ENCOUNTER — MED REC SCAN ONLY (OUTPATIENT)
Dept: FAMILY MEDICINE CLINIC | Facility: CLINIC | Age: 89
End: 2024-06-07

## 2024-07-23 ENCOUNTER — MED REC SCAN ONLY (OUTPATIENT)
Dept: FAMILY MEDICINE CLINIC | Facility: CLINIC | Age: 89
End: 2024-07-23

## 2024-09-17 ENCOUNTER — MED REC SCAN ONLY (OUTPATIENT)
Dept: FAMILY MEDICINE CLINIC | Facility: CLINIC | Age: 89
End: 2024-09-17

## 2024-09-18 DIAGNOSIS — I10 ESSENTIAL HYPERTENSION: ICD-10-CM

## 2024-09-20 RX ORDER — LISINOPRIL 20 MG/1
20 TABLET ORAL DAILY
Qty: 90 TABLET | Refills: 3 | Status: SHIPPED | OUTPATIENT
Start: 2024-09-20

## 2024-09-21 NOTE — TELEPHONE ENCOUNTER
Refill passed per [facility] protocols.    Requested Prescriptions   Pending Prescriptions Disp Refills    lisinopril 20 MG Oral Tab 90 tablet 3     Sig: Take 1 tablet (20 mg total) by mouth daily.       Hypertension Medications Protocol Passed - 9/19/2024  9:27 AM        Passed - CMP or BMP in past 12 months        Passed - Last BP reading less than 140/90     BP Readings from Last 1 Encounters:   05/29/24 134/76               Passed - In person appointment or virtual visit in the past 12 mos or appointment in next 3 mos     Recent Outpatient Visits              3 months ago Cough, unspecified type    [facility] Meenakshi Gibbs Tanja, MD    Office Visit    4 months ago Acute cough    [facility]    [facility]    10 months ago Paroxysmal atrial fibrillation (HCC)    [facility] Meenakshi Gibbs Tanja, MD    Office Visit    1 year ago Lab test positive for detection of COVID-19 virus    [facility]    [facility]    1 year ago Essential hypertension    [facility]    [facility]                      Passed - EGFRCR or GFRNAA > 50     GFR Evaluation  EGFRCR: 84 , resulted on 5/25/2024

## 2024-10-10 ENCOUNTER — TELEPHONE (OUTPATIENT)
Dept: FAMILY MEDICINE CLINIC | Facility: CLINIC | Age: 89
End: 2024-10-10

## 2024-10-10 DIAGNOSIS — I10 ESSENTIAL HYPERTENSION: ICD-10-CM

## 2024-10-10 DIAGNOSIS — R73.03 PREDIABETES: ICD-10-CM

## 2024-10-10 DIAGNOSIS — E78.5 HYPERLIPIDEMIA, UNSPECIFIED HYPERLIPIDEMIA TYPE: Primary | ICD-10-CM

## 2024-10-11 NOTE — TELEPHONE ENCOUNTER
Lab orders discontinued per Dr Corral's  10/11/24 note below.  Patient notified that Dr Corral will do an A1c test in office, no labs required. Patient verbalizes understanding.

## 2024-10-15 NOTE — TELEPHONE ENCOUNTER
REFILL REQUEST    Current Outpatient Medications   Medication Sig Dispense Refill                         metoprolol succinate ER 50 MG Oral Tablet 24 Hr Take 1 tablet (50 mg total) by mouth daily. 90 tablet 3

## 2024-10-17 RX ORDER — METOPROLOL SUCCINATE 50 MG/1
50 TABLET, EXTENDED RELEASE ORAL DAILY
Qty: 90 TABLET | Refills: 3 | Status: SHIPPED | OUTPATIENT
Start: 2024-10-17

## 2024-10-17 RX ORDER — METOPROLOL SUCCINATE 50 MG/1
50 TABLET, EXTENDED RELEASE ORAL DAILY
Qty: 90 TABLET | Refills: 0 | OUTPATIENT
Start: 2024-10-17

## 2024-10-17 NOTE — TELEPHONE ENCOUNTER
Refill passes per [facility] protocol.    Future Appointments   Date Time Provider Department Center   11/1/2024 10:30 AM Ariana Corral MD [facility]     Last office visit: 5/29/2024    Requested Prescriptions   Pending Prescriptions Disp Refills    metoprolol succinate ER 50 MG Oral Tablet 24 Hr 90 tablet 3     Sig: Take 1 tablet (50 mg total) by mouth daily.       Hypertension Medications Protocol Passed - 10/17/2024  5:01 PM        Passed - CMP or BMP in past 12 months        Passed - Last BP reading less than 140/90     BP Readings from Last 1 Encounters:   05/29/24 134/76               Passed - In person appointment or virtual visit in the past 12 mos or appointment in next 3 mos     Recent Outpatient Visits              4 months ago [facility], [facility] type    [facility]    5 months ago [facility] cough    [facility]    [facility]    11 months ago Paroxysmal atrial fibrillation (HCC)    [facility]    [facility]    1 year ago Lab test positive for detection of COVID-19 virus    [facility] Raiza Casey MD    Telemedicine    1 year ago [facility]ential hypertension    [facility]          Future Appointments         Provider Department Appt Notes    In 2 weeks [facility]                    Passed - EGFRCR or GFRNAA > 50     GFR Evaluation  EGFRCR: 84 , resulted on 5/25/2024               Future Appointments         Provider Department Appt Notes    In 2 weeks [facility]23          Recent  Outpatient Visits              4 months ago Cough, unspecified type    [facility], Ariana Vidal MD    Office Visit    5 months ago Acute cough    [facility]    [facility]    11 months ago Paroxysmal atrial fibrillation (HCC)    [facility], Ariana Vidal MD    Office Visit    1 year ago Lab test positive for detection of COVID-19 virus    [facility]    1 year ago Essential hypertension    [facility], Ariana Vidal MD    Office Visit

## 2024-11-01 ENCOUNTER — OFFICE VISIT (OUTPATIENT)
Dept: FAMILY MEDICINE CLINIC | Facility: CLINIC | Age: 89
End: 2024-11-01

## 2024-11-01 VITALS
HEART RATE: 68 BPM | TEMPERATURE: 97 F | HEIGHT: 68 IN | BODY MASS INDEX: 27.74 KG/M2 | DIASTOLIC BLOOD PRESSURE: 64 MMHG | SYSTOLIC BLOOD PRESSURE: 129 MMHG | RESPIRATION RATE: 20 BRPM | WEIGHT: 183 LBS | OXYGEN SATURATION: 97 %

## 2024-11-01 DIAGNOSIS — Z86.73 HISTORY OF TIA (TRANSIENT ISCHEMIC ATTACK): ICD-10-CM

## 2024-11-01 DIAGNOSIS — Z00.00 ENCOUNTER FOR ANNUAL HEALTH EXAMINATION: ICD-10-CM

## 2024-11-01 DIAGNOSIS — D69.6 PLATELETS DECREASED (HCC): ICD-10-CM

## 2024-11-01 DIAGNOSIS — M54.89 OTHER BACK PAIN, UNSPECIFIED CHRONICITY: ICD-10-CM

## 2024-11-01 DIAGNOSIS — I48.0 PAROXYSMAL ATRIAL FIBRILLATION (HCC): ICD-10-CM

## 2024-11-01 DIAGNOSIS — H35.30 MACULAR DEGENERATION OF BOTH EYES, UNSPECIFIED TYPE: ICD-10-CM

## 2024-11-01 DIAGNOSIS — M51.369 DEGENERATION OF INTERVERTEBRAL DISC OF LUMBAR REGION, UNSPECIFIED WHETHER PAIN PRESENT: Primary | ICD-10-CM

## 2024-11-01 DIAGNOSIS — R73.03 PREDIABETES: ICD-10-CM

## 2024-11-01 PROBLEM — Z79.01 ANTICOAGULATED: Status: RESOLVED | Noted: 2023-09-02 | Resolved: 2024-11-01

## 2024-11-01 LAB — HEMOGLOBIN A1C: 6.1 % (ref 4.3–5.6)

## 2024-11-01 NOTE — PROGRESS NOTES
Subjective:   Patient ID: Ivonne Martin is a 89 year old male.    HPI  Here for medicare annual   History/Other:   Review of Systems    Constitutional: Negative.  Negative for activity change, appetite change, diaphoresis and fatigue.     Respiratory: Negative.  Negative for apnea, cough, chest tightness and shortness of breath.    Cardiovascular: Negative.  Negative for chest pain, palpitations and leg swelling.   Gastrointestinal: Negative.  Negative for abdominal pain.   Psychiatric/Behavioral: Negative.    MSK back tenderness    Current Outpatient Medications   Medication Sig Dispense Refill    metoprolol succinate ER 50 MG Oral Tablet 24 Hr Take 1 tablet (50 mg total) by mouth daily. 90 tablet 3    lisinopril 20 MG Oral Tab Take 1 tablet (20 mg total) by mouth daily. 90 tablet 3    rosuvastatin 20 MG Oral Tab Take 1 tablet (20 mg total) by mouth nightly. 90 tablet 1    apixaban (ELIQUIS) 5 MG Oral Tab Eliquis 5 mg tablet, [RxNorm: 0818022] 90 tablet 1    folic acid 800 MCG Oral Tab Take 1 tablet (800 mcg total) by mouth daily.      Multiple Vitamins-Minerals (ICAPS AREDS FORMULA) Oral Tab Take by mouth.      Vitamin B-12 500 MCG Oral Tab Take 1 tablet (500 mcg total) by mouth daily. 90 tablet 3     Allergies:Allergies[1]    Objective:   Physical Exam  Constitutional:       Appearance: He is well-developed.   Cardiovascular:      Rate and Rhythm: Normal rate and regular rhythm.      Heart sounds: Normal heart sounds.   Pulmonary:      Effort: Pulmonary effort is normal.      Breath sounds: Normal breath sounds.   Abdominal:      General: Bowel sounds are normal.      Palpations: Abdomen is soft.   Neurological:      Mental Status: He is alert.      Deep Tendon Reflexes: Reflexes are normal and symmetric.     Back tenderness     Assessment & Plan:   1. Degeneration of intervertebral disc of lumbar region, unspecified whether pain present    2. Macular degeneration of both eyes, unspecified type    3.  Paroxysmal atrial fibrillation (HCC)    4. History of TIA (transient ischemic attack)    5. Platelets decreased (HCC)    Doing well   Will do physical therapy for back pain    Orders Placed This Encounter   Procedures    High Dose Fluzone trivalent influenza, 65yrs+ PFS ([id number])       Meds This Visit:  Requested Prescriptions      No prescriptions requested or ordered in this encounter       Imaging & Referrals:  OPHTHALMOLOGY - INTERNAL  CARDIO - INTERNAL  INFLUENZA VAC HIGH DOSE PRSV FREE             [1] No Known Allergies

## 2025-02-05 RX ORDER — ROSUVASTATIN CALCIUM 20 MG/1
20 TABLET, COATED ORAL NIGHTLY
Qty: 90 TABLET | Refills: 3 | Status: SHIPPED | OUTPATIENT
Start: 2025-02-05

## 2025-02-05 NOTE — TELEPHONE ENCOUNTER
REFILL PASSED PER [facility] PROTOCOLS    Requested Prescriptions   Pending Prescriptions Disp Refills    ROSUVASTATIN 20 MG Oral Tab [Pharmacy Med Name: Rosuvastatin Calcium Oral Tablet 20 MG] 90 tablet 0     Sig: TAKE ONE TABLET BY MOUTH NIGHTLY       Cholesterol Medication Protocol Passed - 2/5/2025  3:15 PM        Passed - ALT < 80     Lab Results   Component Value Date    ALT 13 05/25/2024             Passed - ALT resulted within past year        Passed - Lipid panel within past 12 months     Lab Results   Component Value Date    CHOLEST 143 05/25/2024    TRIG 73 05/25/2024    HDL 42 05/25/2024    LDL 87 05/25/2024    VLDL 12 05/25/2024    NONHDLC 101 05/25/2024             Passed - In person appointment or virtual visit in the past 12 mos or appointment in next 3 mos     Recent Outpatient Visits              3 months ago Degeneration of intervertebral disc of lumbar region, unspecified whether pain present    [facility]    8 months ago Cough, unspecified type    [facility]    9 months ago Acute cough    [facility]    1 year ago Paroxysmal atrial fibrillation (HCC)    [facility]    [facility]    1 year ago Lab test positive for detection of COVID-19 virus    [facility]    [facility]                      Passed - Medication is active on med list               Recent Outpatient Visits              3 months ago Degeneration of intervertebral disc of lumbar region, unspecified whether pain present    [facility]    [facility]    8 months ago Cough, unspecified type    [facility], Ariana Vidal MD    Office Visit    9 months ago [facility] cough    [facility]    1 year ago Paroxysmal atrial fibrillation (HCC)    [facility], Ariana Vidal MD    Office Visit    1 year ago Lab test positive for detection of COVID-19 virus    [facility]    [facility]

## 2025-03-18 ENCOUNTER — LAB ENCOUNTER (OUTPATIENT)
Dept: LAB | Age: 89
End: 2025-03-18
Attending: OPHTHALMOLOGY
Payer: MEDICARE

## 2025-03-18 DIAGNOSIS — I10 ESSENTIAL HYPERTENSION, MALIGNANT: Primary | ICD-10-CM

## 2025-03-18 LAB
ALBUMIN SERPL-MCNC: 4 G/DL (ref 3.2–4.8)
ALBUMIN/GLOB SERPL: 1.5 {RATIO} (ref 1–2)
ALP LIVER SERPL-CCNC: 62 U/L
ALT SERPL-CCNC: 13 U/L
ANION GAP SERPL CALC-SCNC: 6 MMOL/L (ref 0–18)
AST SERPL-CCNC: 20 U/L (ref ?–34)
BASOPHILS # BLD AUTO: 0.08 X10(3) UL (ref 0–0.2)
BASOPHILS NFR BLD AUTO: 1.1 %
BILIRUB SERPL-MCNC: 0.5 MG/DL (ref 0.2–1.1)
BUN BLD-MCNC: 16 MG/DL (ref 9–23)
BUN/CREAT SERPL: 17.8 (ref 10–20)
CALCIUM BLD-MCNC: 8.6 MG/DL (ref 8.7–10.4)
CHLORIDE SERPL-SCNC: 104 MMOL/L (ref 98–112)
CO2 SERPL-SCNC: 28 MMOL/L (ref 21–32)
CREAT BLD-MCNC: 0.9 MG/DL
DEPRECATED RDW RBC AUTO: 42.5 FL (ref 35.1–46.3)
EGFRCR SERPLBLD CKD-EPI 2021: 83 ML/MIN/1.73M2 (ref 60–?)
EOSINOPHIL # BLD AUTO: 0.18 X10(3) UL (ref 0–0.7)
EOSINOPHIL NFR BLD AUTO: 2.6 %
ERYTHROCYTE [DISTWIDTH] IN BLOOD BY AUTOMATED COUNT: 12.9 % (ref 11–15)
FASTING STATUS PATIENT QL REPORTED: NO
GLOBULIN PLAS-MCNC: 2.7 G/DL (ref 2–3.5)
GLUCOSE BLD-MCNC: 104 MG/DL (ref 70–99)
HCT VFR BLD AUTO: 42.2 %
HGB BLD-MCNC: 14.6 G/DL
IMM GRANULOCYTES # BLD AUTO: 0.01 X10(3) UL (ref 0–1)
IMM GRANULOCYTES NFR BLD: 0.1 %
LYMPHOCYTES # BLD AUTO: 2.74 X10(3) UL (ref 1–4)
LYMPHOCYTES NFR BLD AUTO: 39.3 %
MCH RBC QN AUTO: 30.9 PG (ref 26–34)
MCHC RBC AUTO-ENTMCNC: 34.6 G/DL (ref 31–37)
MCV RBC AUTO: 89.2 FL
MONOCYTES # BLD AUTO: 0.7 X10(3) UL (ref 0.1–1)
MONOCYTES NFR BLD AUTO: 10 %
NEUTROPHILS # BLD AUTO: 3.26 X10 (3) UL (ref 1.5–7.7)
NEUTROPHILS # BLD AUTO: 3.26 X10(3) UL (ref 1.5–7.7)
NEUTROPHILS NFR BLD AUTO: 46.9 %
OSMOLALITY SERPL CALC.SUM OF ELEC: 287 MOSM/KG (ref 275–295)
PLATELET # BLD AUTO: 132 10(3)UL (ref 150–450)
POTASSIUM SERPL-SCNC: 4 MMOL/L (ref 3.5–5.1)
PROT SERPL-MCNC: 6.7 G/DL (ref 5.7–8.2)
RBC # BLD AUTO: 4.73 X10(6)UL
SODIUM SERPL-SCNC: 138 MMOL/L (ref 136–145)
WBC # BLD AUTO: 7 X10(3) UL (ref 4–11)

## 2025-03-18 PROCEDURE — 85025 COMPLETE CBC W/AUTO DIFF WBC: CPT

## 2025-03-18 PROCEDURE — 80053 COMPREHEN METABOLIC PANEL: CPT

## 2025-03-18 PROCEDURE — 36415 COLL VENOUS BLD VENIPUNCTURE: CPT

## 2025-04-08 ENCOUNTER — TELEPHONE (OUTPATIENT)
Dept: FAMILY MEDICINE CLINIC | Facility: CLINIC | Age: 89
End: 2025-04-08

## 2025-04-08 DIAGNOSIS — E78.00 PURE HYPERCHOLESTEROLEMIA: ICD-10-CM

## 2025-04-08 DIAGNOSIS — R73.03 PREDIABETES: Primary | ICD-10-CM

## 2025-04-08 NOTE — TELEPHONE ENCOUNTER
Patient is coming in for a 6 month follow up and will like to know if Dr. Corral will like to order any labs prior to his appointment.

## 2025-05-08 ENCOUNTER — LAB ENCOUNTER (OUTPATIENT)
Dept: LAB | Age: 89
End: 2025-05-08
Attending: FAMILY MEDICINE
Payer: MEDICARE

## 2025-05-08 DIAGNOSIS — R73.03 PREDIABETES: ICD-10-CM

## 2025-05-08 DIAGNOSIS — E78.00 PURE HYPERCHOLESTEROLEMIA: ICD-10-CM

## 2025-05-08 LAB
CHOLEST SERPL-MCNC: 151 MG/DL (ref ?–200)
EST. AVERAGE GLUCOSE BLD GHB EST-MCNC: 131 MG/DL (ref 68–126)
FASTING PATIENT LIPID ANSWER: YES
HBA1C MFR BLD: 6.2 % (ref ?–5.7)
HDLC SERPL-MCNC: 48 MG/DL (ref 40–59)
LDLC SERPL CALC-MCNC: 87 MG/DL (ref ?–100)
NONHDLC SERPL-MCNC: 103 MG/DL (ref ?–130)
TRIGL SERPL-MCNC: 84 MG/DL (ref 30–149)
VLDLC SERPL CALC-MCNC: 13 MG/DL (ref 0–30)

## 2025-05-08 PROCEDURE — 80061 LIPID PANEL: CPT

## 2025-05-08 PROCEDURE — 83036 HEMOGLOBIN GLYCOSYLATED A1C: CPT

## 2025-05-08 PROCEDURE — 36415 COLL VENOUS BLD VENIPUNCTURE: CPT

## 2025-05-15 ENCOUNTER — OFFICE VISIT (OUTPATIENT)
Dept: FAMILY MEDICINE CLINIC | Facility: CLINIC | Age: 89
End: 2025-05-15

## 2025-05-15 VITALS
TEMPERATURE: 96 F | SYSTOLIC BLOOD PRESSURE: 117 MMHG | OXYGEN SATURATION: 94 % | DIASTOLIC BLOOD PRESSURE: 76 MMHG | HEIGHT: 68 IN | BODY MASS INDEX: 28.49 KG/M2 | HEART RATE: 77 BPM | RESPIRATION RATE: 20 BRPM | WEIGHT: 188 LBS

## 2025-05-15 DIAGNOSIS — L98.9 SKIN LESION: Primary | ICD-10-CM

## 2025-05-15 DIAGNOSIS — I10 ESSENTIAL HYPERTENSION: ICD-10-CM

## 2025-05-15 DIAGNOSIS — R73.03 PREDIABETES: ICD-10-CM

## 2025-05-15 PROCEDURE — 99214 OFFICE O/P EST MOD 30 MIN: CPT | Performed by: FAMILY MEDICINE

## 2025-05-15 NOTE — PROGRESS NOTES
Subjective:   Patient ID: Ivonne Martin is a 89 year old male.    HPI  Here for f/u prediabetes and hypertension   Patient for f/u hypertension   Denies any chest pain shortness of breath or headaches.   Monitoring blood pressure at home and is below 135/85. Needs refill of medications.     History/Other:   Review of Systems  Constitutional: Negative.  Negative for activity change, appetite change, diaphoresis and fatigue.     Respiratory: Negative.  Negative for apnea, cough, chest tightness and shortness of breath.    Cardiovascular: Negative.  Negative for chest pain, palpitations and leg swelling.   Gastrointestinal: Negative.  Negative for abdominal pain.   Skin: has skin lesion that changed as on chest wall          Psychiatric/Behavioral: Negative.        Allergies:Allergies[1]    Objective:   Physical Exam  Constitutional:       Appearance: He is well-developed.   Cardiovascular:      Rate and Rhythm: Normal rate and regular rhythm.      Heart sounds: Normal heart sounds.   Pulmonary:      Effort: Pulmonary effort is normal.      Breath sounds: Normal breath sounds.   Abdominal:      General: Bowel sounds are normal.      Palpations: Abdomen is soft.   Skin:     Findings: Lesion present.   Neurological:      Mental Status: He is alert.      Deep Tendon Reflexes: Reflexes are normal and symmetric.         Assessment & Plan:   1. Skin lesion    To see dermatology   Hypertension cpm  Prediabetes cpm     No orders of the defined types were placed in this encounter.      Meds This Visit:  Requested Prescriptions      No prescriptions requested or ordered in this encounter       Imaging & Referrals:  [facility] - INTERNAL         [1] No Known Allergies

## 2025-06-12 ENCOUNTER — OFFICE VISIT (OUTPATIENT)
Dept: DERMATOLOGY CLINIC | Facility: CLINIC | Age: 89
End: 2025-06-12

## 2025-06-12 DIAGNOSIS — D49.2 NEOPLASM OF UNSPECIFIED BEHAVIOR OF BONE, SOFT TISSUE, AND SKIN: ICD-10-CM

## 2025-06-12 DIAGNOSIS — L82.1 SEBORRHEIC KERATOSES: Primary | ICD-10-CM

## 2025-06-12 DIAGNOSIS — L81.4 LENTIGINES: ICD-10-CM

## 2025-06-12 PROCEDURE — 17000 DESTRUCT PREMALG LESION: CPT | Performed by: STUDENT IN AN ORGANIZED HEALTH CARE EDUCATION/TRAINING PROGRAM

## 2025-06-12 PROCEDURE — 99204 OFFICE O/P NEW MOD 45 MIN: CPT | Performed by: STUDENT IN AN ORGANIZED HEALTH CARE EDUCATION/TRAINING PROGRAM

## 2025-06-12 PROCEDURE — 11102 TANGNTL BX SKIN SINGLE LES: CPT | Performed by: STUDENT IN AN ORGANIZED HEALTH CARE EDUCATION/TRAINING PROGRAM

## 2025-06-12 PROCEDURE — 88305 TISSUE EXAM BY PATHOLOGIST: CPT | Performed by: STUDENT IN AN ORGANIZED HEALTH CARE EDUCATION/TRAINING PROGRAM

## 2025-06-12 NOTE — PROGRESS NOTES
/New Patient    Referred by: Ariana Corral MD    CHIEF COMPLAINT: Lesion of concern     HISTORY OF PRESENT ILLNESS: Ivonne Martin is a 89 year old male here for evaluation of lesion of concern.    1. Growth   Location: Shoulder  Duration: Years  Bleeding, growing, changing?: No  Scaly?:No    Itchy?:No    Current treatment: None   Past treatments: None      Personal Dermatologic History  History of skin cancer: No  History of  atypical moles: No    FAMILY HISTORY:  History of melanoma: No    Past Medical History  Past Medical History[1]    REVIEW OF SYSTEMS:  Constitutional: Denies fever, chills, unintentional weight loss.   Skin as per HPI    Medications  Current Medications[2]    PHYSICAL EXAM:  General: awake, alert, no acute distress  Neuropsych: appropriate mood and affect  Eyes: Sclerae anicteric, without conjunctival injection, eyelids unremarkable  Skin: Skin exam was performed today including the following: face and chest. Pertinent findings include:   - R medial clavicle with a pink pearly plaque   - face with stellate brown macules   - face with brown stuck on papules     ASSESSMENT & PLAN:  Pathophysiology of diagnoses discussed with patient.  Therapeutic options reviewed. Risks, benefits, and alternatives discussed with patient. Instructions reviewed at length.    #Lentigines  - Discussed benign appearance and provided reassurance. No treatment but observation at this time. Follow-up for concerning physical changes or new symptoms.  - Recommend sun protection with spf 30 or higher, sun protective clothing such as wide brimmed hats and long sleeves. Recommend avoiding midday sun (10 am- 3 pm).     #Seborrheic Keratoses  - Reassured patient regarding benign nature of lesions. No treatment but observation at this time. Follow-up for concerning physical changes or new symptoms.    #Neoplasm(s) of uncertain behavior of skin  - Shave biopsy performed today   - Will notify patient with results and arrange  for appropriate definitive treatment, if indicated.      Shave of lesion to establish and confirm diagnosis:  Photo taken: Yes    Risks, benefits, alternatives and personnel required for shave biopsy reviewed with patient. Risks discussed include, but not limited to: pain, bleeding, infection, scar, reaction to anesthetic, and recurrence/need for further treatment.  Patient and physician agree as to site(s) to be biopsied. Patient verbalizes understanding and wishes to proceed.     Site(s) prepped with alcohol and anesthetized with 1% lidocaine with epinephrine.   Shave of lesion(s) performed to the level of the dermis. Specimen(s) from A. R medial clavicle sent for pathology to r/o BCC 50% ALCL and bandaging applied.   Written and verbal wound care instructions provided to patient, understanding verbalized.      Return to clinic: 6 months or sooner if something concerning arises     Felix Luna MD    By signing my name below, IOsiel MA,  attest that this documentation has been prepared under the direction and in the presence of Felix Luna MD.   Electronically Signed: Osiel DEVI MA, 6/12/2025, 9:24 AM.    I, Felix Luna MD,  personally performed the services described in this documentation. All medical record entries made by the scribe were at my direction and in my presence.  I have reviewed the chart and agree that the record reflects my personal performance and is accurate and complete.  Felix Luna MD, 6/12/2025, 9:32 AM             [1]   Past Medical History:   Age-related nuclear cataract of both eyes    Age-related nuclear cataract of both eyes    Age-related nuclear cataract of both eyes    Cataract    Diet-controlled diabetes mellitus (HCC)    Epiretinal membrane    Floater, vitreous, bilateral    Floater, vitreous, bilateral    Floater, vitreous, bilateral    Floater, vitreous, bilateral    Floaters    Lumbar degenerative disc disease: moderate    Lumbar degenerative disc disease:  moderate    Lumbar degenerative disc disease: moderate    Lumbar spondylosis: moderate    Lumbar spondylosis: moderate    Macular degeneration    Other and unspecified hyperlipidemia    Platelets decreased    Rheumatoid arthritis (HCC)    Type 2 diabetes mellitus without retinopathy (HCC)    Unspecified essential hypertension   [2]   Current Outpatient Medications   Medication Sig Dispense Refill    rosuvastatin 20 MG Oral Tab Take 1 tablet (20 mg total) by mouth nightly. 90 tablet 3    metoprolol succinate ER 50 MG Oral Tablet 24 Hr Take 1 tablet (50 mg total) by mouth daily. 90 tablet 3    lisinopril 20 MG Oral Tab Take 1 tablet (20 mg total) by mouth daily. 90 tablet 3    apixaban (ELIQUIS) 5 MG Oral Tab Eliquis 5 mg tablet, [RxNorm: 2507454] 90 tablet 1    folic acid 800 MCG Oral Tab Take 1 tablet (800 mcg total) by mouth daily.      Multiple Vitamins-Minerals (ICAPS AREDS FORMULA) Oral Tab Take by mouth.      Vitamin B-12 500 MCG Oral Tab Take 1 tablet (500 mcg total) by mouth daily. 90 tablet 3

## 2025-06-16 NOTE — PROGRESS NOTES
Patient informed of test results and all DM's recommendations. Voiced understanding. Pt chose Dr. Ozuna - path routed, contact info provided

## 2025-06-23 ENCOUNTER — TELEPHONE (OUTPATIENT)
Dept: FAMILY MEDICINE CLINIC | Facility: CLINIC | Age: 89
End: 2025-06-23

## 2025-06-23 NOTE — CONSULTS
[facility] Surgical Oncology and Breast Surgery      Patient Name:  Ivonne Martin   YOB: 1935   Gender:  Male   Appt Date:  6/24/2025   Provider:  Jose Ozuna MD   Insurance:  MEDICARE PART B ONLY     PATIENT PROVIDERS  Referring Provider: No ref. provider found   Address: No referring provider defined for this encounter.   Phone #: N/A    Primary Care Provider:Ariana Corral MD   Address: [address] [place] [address]   Phone #: [phone]       CHIEF COMPLAINT  No chief complaint on file.  Consult     PROBLEMS  Reviewed Problem List[1]     History of Present Illness:  Ivonne Martin is a 89 year old male with PMHx diabetes, hypertension, rheumatoid arthritis, who is referred by Dr. Luna to render an opinion regarding the surgical management of right medial clavicle basal cell carcinoma, nodular type.     Patient presented to dermatologist for evaluation of lesion of concern.  Patient states that the lesion has been present for years on his shoulder.  Denies any bleeding or itchiness.  Patient has no personal or family history of melanoma or skin cancers.  Shave biopsies obtained 6/12/2025.  Pathology of right medial clavicle lesion, detailed below, shows  basal cell carcinoma, nodular type.  With tumor involving the deep margin.    Here today to discuss surgical options.       Vital Signs:  There were no vitals taken for this visit.     Medications Reviewed:  Medications - Current[2]     Allergies Reviewed:  Allergies[3]     History:  Reviewed:  Past Medical History[4]   Reviewed:  Past Surgical History[5]   Reviewed Social History:  Social Hx on file[6]   Reviewed:  Family History[7]     Review of Systems:  Review of Systems   Constitutional:  Negative for activity change, appetite change, chills, fever and unexpected weight change.   HENT:  Negative for nosebleeds and trouble swallowing.    Eyes:  Negative for discharge and redness.   Respiratory:  Negative for cough,  chest tightness and shortness of breath.    Cardiovascular:  Negative for chest pain and leg swelling.   Gastrointestinal:  Negative for abdominal distention, abdominal pain, blood in stool, nausea and vomiting.   Endocrine: Negative for polydipsia and polyphagia.   Genitourinary:  Negative for difficulty urinating, dysuria and hematuria.   Musculoskeletal:  Negative for myalgias.   Skin:  Negative for color change and pallor.   Allergic/Immunologic: Negative for immunocompromised state.   Neurological:  Negative for syncope, speech difficulty, weakness and numbness.   Hematological:  Does not bruise/bleed easily.   Psychiatric/Behavioral:  Negative for agitation and confusion.         Physical Examination:  Physical Exam  Constitutional:       General: He is not in acute distress.     Appearance: He is well-developed. He is not diaphoretic.   HENT:      Head: Normocephalic and atraumatic.   Eyes:      General: No scleral icterus.     Pupils: Pupils are equal, round, and reactive to light.   Neck:      Thyroid: No thyromegaly.   Cardiovascular:      Rate and Rhythm: Normal rate and regular rhythm.      Heart sounds: No murmur heard.  Pulmonary:      Effort: Pulmonary effort is normal. No respiratory distress.   Abdominal:      General: There is no distension.      Palpations: Abdomen is soft.      Tenderness: There is no abdominal tenderness.   Musculoskeletal:         General: Normal range of motion.   Skin:     General: Skin is warm and dry.      Coloration: Skin is not jaundiced.   Neurological:      Mental Status: He is alert and oriented to person, place, and time.   Psychiatric:         Mood and Affect: Mood normal.         Behavior: Behavior normal. Behavior is cooperative.        Case Report     Surgical Pathology                                Case: [id number]                                     Authorizing Provider:  Felix Luna MD       Collected:           06/12/2025 10:07 AM             Ordering  Location:     [facility]    Received:            06/12/2025 10:07 AM                                    [facility]                                                                       Pathologist:           Viviana José MD                                                               Specimen:    Chest, R medial clavicle r/o BCC                                                              Final Diagnosis:     Skin, right medial clavicle, shave biopsy:  - Basal cell carcinoma, nodular type.  - The tumor involves the deep margin.     Electronically signed by Viviana José MD on 6/16/2025 at 1026 CDT        Clinical Information      D49.2 Neoplasm Of Unspecified Behavior Of Bone, Soft Tissue, And Skin.        Gross Description      A. Labeled with the patient's name, MRN, and \"right medial clavicle\"  Received in formalin: An unoriented soft, mottled, pink to gray-tan skin shave measuring 0.7 x 0.6 cm.  The specimen is inked blue along the resection margin and submitted entirely in A1.     (ZEKE)     Interpretation     Abnormal Abnormal            Assessment / Plan:  Proceed with WLE  Counseled about risks of surgery   All questions answered     Jose Ozuna MD  [facility] General Surgical Oncology  System Medical Director of Surgical Services  [facility]      Follow Up:  No follow-ups on file.       Electronically Signed by: Jose Ozuna MD          [1]   Patient Active Problem List  Diagnosis    Macular degeneration    Age-related nuclear cataract of both eyes    Floater, vitreous, bilateral    Lumbar degenerative disc disease: moderate    History of TIA (transient ischemic attack)    Paroxysmal atrial fibrillation (HCC)    Platelets decreased   [2]   Current Outpatient Medications:     rosuvastatin 20 MG Oral Tab, Take 1 tablet (20 mg total) by mouth nightly., Disp: 90 tablet, Rfl:  3    metoprolol succinate ER 50 MG Oral Tablet 24 Hr, Take 1 tablet (50 mg total) by mouth daily., Disp: 90 tablet, Rfl: 3    lisinopril 20 MG Oral Tab, Take 1 tablet (20 mg total) by mouth daily., Disp: 90 tablet, Rfl: 3    apixaban (ELIQUIS) 5 MG Oral Tab, Eliquis 5 mg tablet, [RxNorm: 3348380], Disp: 90 tablet, Rfl: 1    folic acid 800 MCG Oral Tab, Take 1 tablet (800 mcg total) by mouth daily., Disp: , Rfl:     Multiple Vitamins-Minerals (ICAPS AREDS FORMULA) Oral Tab, Take by mouth., Disp: , Rfl:     Vitamin B-12 500 MCG Oral Tab, Take 1 tablet (500 mcg total) by mouth daily., Disp: 90 tablet, Rfl: 3  [3] No Known Allergies  [4]   Past Medical History:   Age-related nuclear cataract of both eyes    Age-related nuclear cataract of both eyes    Age-related nuclear cataract of both eyes    BCC (basal cell carcinoma)    right medial clavicle    Cataract    Diet-controlled diabetes mellitus (HCC)    Epiretinal membrane    Floater, vitreous, bilateral    Floater, vitreous, bilateral    Floater, vitreous, bilateral    Floater, vitreous, bilateral    Floaters    Lumbar degenerative disc disease: moderate    Lumbar degenerative disc disease: moderate    Lumbar degenerative disc disease: moderate    Lumbar spondylosis: moderate    Lumbar spondylosis: moderate    Macular degeneration    Other and unspecified hyperlipidemia    Platelets decreased    Rheumatoid arthritis (HCC)    Type 2 diabetes mellitus without retinopathy (HCC)    Unspecified essential hypertension   [5]   Past Surgical History:  Procedure Laterality Date    Cataract      Repair incisional hernia,mariella  5/16/15    repair of incarcerated/strangulated R inguinal hernia with reduction of the incarcerated/strangulated lipoma of the preperitoneum and insertion of extra large and med PerFix plugs and mesh    Skin surgery  2010    L shoulder skin abscess I/D   [6]   Social History  Socioeconomic History    Marital status:     Number of children: 3    Occupational History    Occupation: research     Comment: retired   Tobacco Use    Smoking status: Never    Smokeless tobacco: Never   Vaping Use    Vaping status: Never Used   Substance and Sexual Activity    Alcohol use: Not Currently     Alcohol/week: 0.0 standard drinks of alcohol     Comment: RARELY    Drug use: No   Other Topics Concern    Caffeine Concern Yes     Comment: Coffee, 3 cups daily    Exercise Yes     Comment: walking 30 minutes daily.    Reaction to local anesthetic No    Pt has a pacemaker No    Pt has a defibrillator No   [7]   Family History  Problem Relation Age of Onset    Cancer Mother         STOMACH    Prostate Cancer Father     Glaucoma Neg     Macular degeneration Neg     Diabetes Neg

## 2025-06-23 NOTE — H&P (VIEW-ONLY)
[facility] Surgical Oncology and Breast Surgery      Patient Name:  Ivonne Martin   YOB: 1935   Gender:  Male   Appt Date:  6/24/2025   Provider:  Jose Ozuna MD   Insurance:  MEDICARE PART B ONLY     PATIENT PROVIDERS  Referring Provider: No ref. provider found   Address: No referring provider defined for this encounter.   Phone #: N/A    Primary Care Provider:Ariana Corral MD   Address: [address] [place] [address]   Phone #: [phone]       CHIEF COMPLAINT  No chief complaint on file.  Consult     PROBLEMS  Reviewed Problem List[1]     History of Present Illness:  Ivonne Martin is a 89 year old male with PMHx diabetes, hypertension, rheumatoid arthritis, who is referred by Dr. Luna to render an opinion regarding the surgical management of right medial clavicle basal cell carcinoma, nodular type.     Patient presented to dermatologist for evaluation of lesion of concern.  Patient states that the lesion has been present for years on his shoulder.  Denies any bleeding or itchiness.  Patient has no personal or family history of melanoma or skin cancers.  Shave biopsies obtained 6/12/2025.  Pathology of right medial clavicle lesion, detailed below, shows  basal cell carcinoma, nodular type.  With tumor involving the deep margin.    Here today to discuss surgical options.       Vital Signs:  There were no vitals taken for this visit.     Medications Reviewed:  Medications - Current[2]     Allergies Reviewed:  Allergies[3]     History:  Reviewed:  Past Medical History[4]   Reviewed:  Past Surgical History[5]   Reviewed Social History:  Social Hx on file[6]   Reviewed:  Family History[7]     Review of Systems:  Review of Systems   Constitutional:  Negative for activity change, appetite change, chills, fever and unexpected weight change.   HENT:  Negative for nosebleeds and trouble swallowing.    Eyes:  Negative for discharge and redness.   Respiratory:  Negative for cough,  chest tightness and shortness of breath.    Cardiovascular:  Negative for chest pain and leg swelling.   Gastrointestinal:  Negative for abdominal distention, abdominal pain, blood in stool, nausea and vomiting.   Endocrine: Negative for polydipsia and polyphagia.   Genitourinary:  Negative for difficulty urinating, dysuria and hematuria.   Musculoskeletal:  Negative for myalgias.   Skin:  Negative for color change and pallor.   Allergic/Immunologic: Negative for immunocompromised state.   Neurological:  Negative for syncope, speech difficulty, weakness and numbness.   Hematological:  Does not bruise/bleed easily.   Psychiatric/Behavioral:  Negative for agitation and confusion.         Physical Examination:  Physical Exam  Constitutional:       General: He is not in acute distress.     Appearance: He is well-developed. He is not diaphoretic.   HENT:      Head: Normocephalic and atraumatic.   Eyes:      General: No scleral icterus.     Pupils: Pupils are equal, round, and reactive to light.   Neck:      Thyroid: No thyromegaly.   Cardiovascular:      Rate and Rhythm: Normal rate and regular rhythm.      Heart sounds: No murmur heard.  Pulmonary:      Effort: Pulmonary effort is normal. No respiratory distress.   Abdominal:      General: There is no distension.      Palpations: Abdomen is soft.      Tenderness: There is no abdominal tenderness.   Musculoskeletal:         General: Normal range of motion.   Skin:     General: Skin is warm and dry.      Coloration: Skin is not jaundiced.   Neurological:      Mental Status: He is alert and oriented to person, place, and time.   Psychiatric:         Mood and Affect: Mood normal.         Behavior: Behavior normal. Behavior is cooperative.        Case Report     Surgical Pathology                                Case: [id number]                                     Authorizing Provider:  Felix Luna MD       Collected:           06/12/2025 10:07 AM             Ordering  Location:     [facility]    Received:            06/12/2025 10:07 AM                                    [facility]                                                                       Pathologist:           Viviana José MD                                                               Specimen:    Chest, R medial clavicle r/o BCC                                                              Final Diagnosis:     Skin, right medial clavicle, shave biopsy:  - Basal cell carcinoma, nodular type.  - The tumor involves the deep margin.     Electronically signed by Viviana José MD on 6/16/2025 at 1026 CDT        Clinical Information      D49.2 Neoplasm Of Unspecified Behavior Of Bone, Soft Tissue, And Skin.        Gross Description      A. Labeled with the patient's name, MRN, and \"right medial clavicle\"  Received in formalin: An unoriented soft, mottled, pink to gray-tan skin shave measuring 0.7 x 0.6 cm.  The specimen is inked blue along the resection margin and submitted entirely in A1.     (ZEKE)     Interpretation     Abnormal Abnormal            Assessment / Plan:  Proceed with WLE  Counseled about risks of surgery   All questions answered     Jose Ozuna MD  [facility] General Surgical Oncology  System Medical Director of Surgical Services  [facility]      Follow Up:  No follow-ups on file.       Electronically Signed by: Jose Ozuna MD          [1]   Patient Active Problem List  Diagnosis    Macular degeneration    Age-related nuclear cataract of both eyes    Floater, vitreous, bilateral    Lumbar degenerative disc disease: moderate    History of TIA (transient ischemic attack)    Paroxysmal atrial fibrillation (HCC)    Platelets decreased   [2]   Current Outpatient Medications:     rosuvastatin 20 MG Oral Tab, Take 1 tablet (20 mg total) by mouth nightly., Disp: 90 tablet, Rfl:  3    metoprolol succinate ER 50 MG Oral Tablet 24 Hr, Take 1 tablet (50 mg total) by mouth daily., Disp: 90 tablet, Rfl: 3    lisinopril 20 MG Oral Tab, Take 1 tablet (20 mg total) by mouth daily., Disp: 90 tablet, Rfl: 3    apixaban (ELIQUIS) 5 MG Oral Tab, Eliquis 5 mg tablet, [RxNorm: [id number]], Disp: 90 tablet, Rfl: 1    folic acid 800 MCG Oral Tab, Take 1 tablet (800 mcg total) by mouth daily., Disp: , Rfl:     Multiple Vitamins-Minerals (ICAPS AREDS FORMULA) Oral Tab, Take by mouth., Disp: , Rfl:     Vitamin B-12 500 MCG Oral Tab, Take 1 tablet (500 mcg total) by mouth daily., Disp: 90 tablet, Rfl: 3  [3] No Known Allergies  [4]   Past Medical History:   Age-related nuclear cataract of both eyes    Age-related nuclear cataract of both eyes    Age-related nuclear cataract of both eyes    BCC (basal cell carcinoma)    right medial clavicle    Cataract    Diet-controlled diabetes mellitus (HCC)    Epiretinal membrane    Floater, vitreous, bilateral    Floater, vitreous, bilateral    Floater, vitreous, bilateral    Floater, vitreous, bilateral    Floaters    Lumbar degenerative disc disease: moderate    Lumbar degenerative disc disease: moderate    Lumbar degenerative disc disease: moderate    Lumbar spondylosis: moderate    Lumbar spondylosis: moderate    Macular degeneration    Other and unspecified hyperlipidemia    Platelets decreased    Rheumatoid arthritis (HCC)    Type 2 diabetes mellitus without retinopathy (HCC)    Unspecified essential hypertension   [5]   Past Surgical History:  Procedure Laterality Date    Cataract      Repair incisional hernia,mariella  5/16/15    repair of incarcerated/strangulated R inguinal hernia with reduction of the incarcerated/strangulated lipoma of the preperitoneum and insertion of extra large and med PerFix plugs and mesh    Skin surgery  2010    L shoulder skin abscess I/D   [6]   Social History  Socioeconomic History    Marital status:     Number of children: 3    Occupational History    Occupation: research     Comment: retired   Tobacco Use    Smoking status: Never    Smokeless tobacco: Never   Vaping Use    Vaping status: Never Used   Substance and Sexual Activity    Alcohol use: Not Currently     Alcohol/week: 0.0 standard drinks of alcohol     Comment: RARELY    Drug use: No   Other Topics Concern    Caffeine Concern Yes     Comment: Coffee, 3 cups daily    Exercise Yes     Comment: walking 30 minutes daily.    Reaction to local anesthetic No    Pt has a pacemaker No    Pt has a defibrillator No   [7]   Family History  Problem Relation Age of Onset    Cancer Mother         STOMACH    Prostate Cancer Father     Glaucoma Neg     Macular degeneration Neg     Diabetes Neg

## 2025-06-24 ENCOUNTER — OFFICE VISIT (OUTPATIENT)
Facility: CLINIC | Age: 89
End: 2025-06-24
Payer: MEDICARE

## 2025-06-24 VITALS
SYSTOLIC BLOOD PRESSURE: 107 MMHG | DIASTOLIC BLOOD PRESSURE: 67 MMHG | HEART RATE: 81 BPM | WEIGHT: 187 LBS | OXYGEN SATURATION: 97 % | RESPIRATION RATE: 16 BRPM | BODY MASS INDEX: 28 KG/M2

## 2025-06-24 DIAGNOSIS — C44.91 BASAL CELL ADENOCARCINOMA: Primary | ICD-10-CM

## 2025-06-24 PROCEDURE — 99205 OFFICE O/P NEW HI 60 MIN: CPT | Performed by: SURGERY

## 2025-06-24 NOTE — PATIENT INSTRUCTIONS
Surgery: Wide local excision of right medial clavicle basal cell carcinoma    Date of Surgery: TBD    Surgery Length: 30 minutes    Anesthesia: [x]Local   []MAC  [][facility]    Hospital:    [facility] [address] [facility], [place] [address]   Phone: [phone]. Pre-Admission Testing (Arrival time and instructions): [phone]    This is an outpatient procedure.  Use the provided Chlorhexadine surgical soap(instructions attached) to shower the night before and morning of your procedure.  Do not apply powders, creams, lotions or deodorant after showering.  Do not apply any kind of makeup and make sure to remove nail polish prior to your surgery.  Bring your picture ID and insurance card with you.  Wear comfortable clothing that can easily be removed. Preferably, something that zips, snaps, or buttons up the front.   You will be contacted by the hospital the day prior to your surgery to confirm details and give you specific instructions about when and where to arrive the day of your procedure.   If you are taking blood thinners including: Plavix, Eliquis, Coumadin you will need to contact the prescribing provider for specific instructions on holding these medications for your procedure  Motrin, Advil, Ibuprofen, Aspirin, Baby Aspirin and Fish Oil are also blood thinners and need to be held at least one week prior to your procedure. It is okay to take Tylenol.  Inform your primary care physician of your surgery and ask if him/her will need to see you prior to surgery.      Pre-Operative Testing   CBC  CMP  BMP    PT, PTT, INR  UA  EKG    Chest X-Ray x Anticoagulation recommendations  H & P Medical Clearance     Does patient have pacemaker: [] YES [] No Does patient have PEDRO:  [] YES [] No  Is patient diabetic?  [] YES    [] NO    Please call PCP/specialty physician to schedule pre-op exam for medical clearance.  PCP/specialty physician to fax clearance to office 2 weeks prior to surgery.     Jose  MD Sulma  Complex General Surgical Oncology  System Medical Director of Surgical Services  [facility], [facility]    For Dr. Ozuna's office: [phone]/ Fax: [phone]  After hours you will reach the answering service    Dr. Ozuna's nurse; Ursula RN:  [phone] (voicemail)  Monday through Friday 8:30 am to 4:30 pm     Central [phone] [place] [phone] Meenakshi  Medical Records:   [phone]

## 2025-06-25 ENCOUNTER — PATIENT MESSAGE (OUTPATIENT)
Dept: FAMILY MEDICINE CLINIC | Facility: CLINIC | Age: 89
End: 2025-06-25

## 2025-06-27 ENCOUNTER — DOCUMENTATION ONLY (OUTPATIENT)
Dept: SURGERY | Facility: CLINIC | Age: 89
End: 2025-06-27

## 2025-06-27 NOTE — PROGRESS NOTES
Date of Surgery: TBD    Diagnosis: Basal cell carcinoma of skin of right upper limb, including shoulder; C44. 612    Procedure:  Wide local excision of right medial clavicle basal cell carcinoma     Location: [x] [facility]  [] [facility]  [] [facility]    Type: [] Inpatient  [x] Outpatient  [] 23-hour observation    Number of days inpatient: N/A    Length of Surgery: 30 minutes    Anesthesia: [x] Local  [] MAC [] General  [] Pec Block     Joint case with: [] Yes,   [x] No   Needs SA:  [x] Yes  [] No    Position/Special Equipment:    Penicillin Allergy: [] Yes [x] No   Nickel Allergy: [] Yes [x] No   Latex Allergy: [] Yes [x] No    Orders:  Colon Bundle/Bowel Prep: [] Yes  [x] No    Type and Cross 2 units PRBC: [] Yes [x] No    Type and Screen: [] Yes [x] No    Advanced Oncology Order Set (HIPEC): [] Yes [x] No    Heparin Pre-op (Heparin 5000 units subQ x 1 dose): [] Yes  [x] No    Nuclear Med Injection: [] Yes  [x] No    Wire localization needed: [] Yes [x] No    Midline placement (HIPEC,Whipple, Esophagectomy, Liver): [] Yes [x] No    CHG Cloths (HIPEC, Whipple, Esophagectomy, Liver): [] Yes [x] No    Tylenol administration prior to surgery: [] Yes [x] No    Does patient have a pacemaker: [] Yes [x] No  Sleep Apnea: [] Yes  [x] No      Is patient diabetic: [] Yes, if so, no Ensure/yes to Sugar free Gatorade [x] No    Antibiotics: [] /EM prophylactic antibiotic protocol [x] No need of antibiotics    PAT Orders: []CBC  []CMP []EKG  []CT Scan []Chest X-ray

## 2025-07-01 ENCOUNTER — TELEPHONE (OUTPATIENT)
Facility: CLINIC | Age: 89
End: 2025-07-01

## 2025-07-01 DIAGNOSIS — C44.91 BASAL CELL ADENOCARCINOMA: Primary | ICD-10-CM

## 2025-07-01 NOTE — TELEPHONE ENCOUNTER
Notified patient of surgery Wide local excision of right medial clavicle basal cell carcinoma scheduled for 7/9/2025 at [place]. Patient agreeable to surgery date and place. Advised patient to call back regarding any questions or concerns. Patient verbalized understanding.

## 2025-07-07 NOTE — DISCHARGE INSTRUCTIONS
Wide Excision of Basal Cell Carcinoma Discharge Instructions  Thank you for choosing the Surgical Oncology team at [facility].        Managing Your Pain  Follow the guidelines below to help manage your pain at home:  Take your medications as directed and as needed. You may also take 1000 mg of acetaminophen (Tylenol®) every 6 hours as needed for pain. If taking hydrocodone (Norco) do not take additional acetaminophen (Tylenol).  Call our office if the medication prescribed for you does not ease your pain.  Don't drive or drink alcohol while you're taking prescription pain medications.  Keep track of when you take your pain medication. It works best 30 to 45 minutes after you take it.   Caring for Your Incision  It's normal for the skin below your incision to feel numb. This happens because some of the nerves were cut during your surgery. The numbness will go away over time.  Leave the dressing on for 48 hours after surgery. The clear outer covering (tegaderm) can then come off.   Your surgeon used dissolvable sutures (stitches) underneath your skin, and they will not need to be removed.   The Steri-Strips (small white adhesive strips) on your incision will loosen and fall off by themselves. If they haven't fallen off within 14 days, you can take them off.  If the area around your incision is red, puffy, or if you have any drainage from your incision, contact our office.  Showering  You may shower 48 hours after surgery. When you shower, use soap to gently wash your incision. After you shower, pat the area dry with a clean towel. Don't rub over your incision. Leave your incision uncovered, unless there's drainage.  Avoid tub baths until your surgeon says it's okay.  Activity  After the procedure, take it easy for the rest of the day.  If you had general anesthesia, don't use machinery or power tools, drink alcohol, or make any major decisions for at least the first 24 hours.  Avoid lifting more than 10  pounds with your affected arm for at least 4 weeks  Avoid rigorous movement with affected arm for at least 4 weeks     When to Call:  Contact our office if you experience the following symptoms:  Fever of 100.4° F (38°C) or higher  Cloudy or smelly drainage from the incision site  The skin around your incision is warm/red/swollen  Sudden increase in pain or new pain  Shaking chills  Fast pulse  Shortness of breath or chest pain  Nausea or vomiting   Diarrhea  Constipation that isn't relieved in 2 days  Any new or unexplained symptoms    We will call you to set up a follow-up appointment.    Please arrive at the following location:  [facility]: [address] [place] [address] [place] [id number]  [facility]: [address] [place] [address]  Please call us at [phone] if you are unable to make it to your appointment or if you have any questions.

## 2025-07-09 ENCOUNTER — HOSPITAL ENCOUNTER (OUTPATIENT)
Facility: HOSPITAL | Age: 89
Setting detail: HOSPITAL OUTPATIENT SURGERY
Discharge: HOME | End: 2025-07-09
Attending: SURGERY | Admitting: SURGERY
Payer: MEDICARE

## 2025-07-09 VITALS
HEIGHT: 68 IN | HEART RATE: 64 BPM | OXYGEN SATURATION: 98 % | DIASTOLIC BLOOD PRESSURE: 76 MMHG | WEIGHT: 186 LBS | SYSTOLIC BLOOD PRESSURE: 133 MMHG | BODY MASS INDEX: 28.19 KG/M2 | TEMPERATURE: 99 F | RESPIRATION RATE: 14 BRPM

## 2025-07-09 DIAGNOSIS — C44.91 BASAL CELL ADENOCARCINOMA: Primary | ICD-10-CM

## 2025-07-09 PROCEDURE — 88305 TISSUE EXAM BY PATHOLOGIST: CPT | Performed by: SURGERY

## 2025-07-09 PROCEDURE — 88331 PATH CONSLTJ SURG 1 BLK 1SPC: CPT | Performed by: SURGERY

## 2025-07-09 RX ORDER — HYDROCODONE BITARTRATE AND ACETAMINOPHEN 5; 325 MG/1; MG/1
1-2 TABLET ORAL EVERY 6 HOURS PRN
Qty: 10 TABLET | Refills: 0 | Status: SHIPPED | OUTPATIENT
Start: 2025-07-09

## 2025-07-09 NOTE — INTERVAL H&P NOTE
Patient seen and examined. No changes to the attached history and physical are noted.     89 year old male presenting today for planned WLE.    Jose Ozuna MD  [facility] General Surgical Oncology  [facility]  Pager [phone]  [email or web address]

## 2025-07-10 ENCOUNTER — TELEPHONE (OUTPATIENT)
Facility: CLINIC | Age: 89
End: 2025-07-10

## 2025-07-10 NOTE — OPERATIVE REPORT
Date of operation: 7/8/2025    Preoperative diagnosis:  1.  Nodular basal cell carcinoma, right medial clavicle    Operations performed:  1.  Wide local excision, nodular basal cell carcinoma of the right medial clavicle, specimen measured 6 x 3 cm  2.  Complex closure, 6 x 3 cm defect, right medial clavicle    Postoperative diagnosis:  Same    Operating Surgeon:  Jose Ozuna MD    Surgical Assistant:  Lolita Adams RSA    Indications:  89-year-old gentleman diagnosed with a nodular subtype basal cell carcinoma of the right medial clavicle. He presents for definitive surgical management via wide local excision and complex closure. Physical exam and review of imaging were consistent with localized disease.    Details of the operation:  Patient was brought to the operating room and laid supine on the operative table. All pressure points were padded properly. The right clavicular region was prepped and draped in a standard sterile manner. Timeout was completed to verify the patient’s name, procedure to be performed, and site; all members of the team were in agreement.    A total of 20 cc of 1% lidocaine with 0.5% Marcaine with epinephrine was infiltrated into the planned surgical site. An elliptical incision was designed along relaxed skin tension lines. The incision was made with a 15 blade and carried through the subcutaneous tissue using electrocautery. Dissection proceeded to the level of the superficial fascia. The specimen was excised in its entirety, oriented, and sent for intraoperative margin analysis. Frozen section pathology returned during the procedure with no evidence of malignancy at the margins.    To achieve a tension-free closure, flaps were elevated superiorly and inferiorly at the fascial level. The wound was closed in two layers using 3-0 Vicryl for the interrupted deep dermal layer and 4-0 Vicryl in a running subcuticular fashion. Sterile dressings were applied.    Jose Ozuna MD  Complex  General Surgical Oncology  System Medical Director of Surgical Services  [facility]

## 2025-07-10 NOTE — TELEPHONE ENCOUNTER
Post op call made to patient. Patient states he is doing okay. Denies fevers, no nausea or vomiting. States pain is 1 on scale 0-10, controlled with PRN medication. Patient is tolerating activity without complaints. No concerns with surgical sites. Wound care instructions provided. Path is still pending. Patient requested to start Eliquis sooner. Message sent to Dr. Ozuna. Dr. Ozuna states patient can start Eliquis 7/11 instead of 7/13 as written on AV[facility] discharge paper. Patient verbalized understanding.     Post op appointment scheduled with SOURAV Moeller  at 8:30 am on 7/24 at [place].    Patient agrees to call if any problems or concerns.

## 2025-07-24 ENCOUNTER — OFFICE VISIT (OUTPATIENT)
Facility: CLINIC | Age: 89
End: 2025-07-24
Payer: MEDICARE

## 2025-07-24 VITALS
OXYGEN SATURATION: 95 % | SYSTOLIC BLOOD PRESSURE: 113 MMHG | TEMPERATURE: 98 F | WEIGHT: 189.38 LBS | DIASTOLIC BLOOD PRESSURE: 71 MMHG | BODY MASS INDEX: 29 KG/M2 | RESPIRATION RATE: 16 BRPM

## 2025-07-24 DIAGNOSIS — C44.91 BASAL CELL ADENOCARCINOMA: Primary | ICD-10-CM

## 2025-07-24 NOTE — PROGRESS NOTES
[facility] Surgical Oncology and Breast Surgery      Patient Name:  Ivonne Martin   YOB: 1935   Gender:  Male   Appt Date:  7/24/2025   Provider:  Jose Ozuna MD   Insurance:  MEDICARE PART B ONLY     PATIENT PROVIDERS  Referring Provider: No ref. provider found   Address: No referring provider defined for this encounter.   Phone #: N/A    Primary Care Provider:Ariana Corral MD   Address: [address] [place] [address]   Phone #: [phone]       CHIEF COMPLAINT  Chief Complaint   Patient presents with    Post-Op        PROBLEMS  Reviewed Problem List[1]     History of Present Illness:  Patient here for post operative visit s/p wide local excision, nodular basal cell carcinoma of the right medial clavicle. Pathology, detailed below, shows Basal cell carcinoma, nodular type. All excisional margins are negative for tumor. Reviewed with patient. Patient states he is doing very well. Denies pain, loss of ROM, fever, chills, erythema/drainage from incision site. Resuming his normal activities without difficulty.        Ivonne Martin is a 89 year old male with PMHx diabetes, hypertension, rheumatoid arthritis, who is referred by Dr. Luna to render an opinion regarding the surgical management of right medial clavicle basal cell carcinoma, nodular type.     Patient presented to dermatologist for evaluation of lesion of concern.  Patient states that the lesion has been present for years on his shoulder.  Denies any bleeding or itchiness.  Patient has no personal or family history of melanoma or skin cancers.  Shave biopsies obtained 6/12/2025.  Pathology of right medial clavicle lesion, detailed below, shows  basal cell carcinoma, nodular type with tumor involving the deep margin. Ultimately, patient taken to the OR on 7/9 for WLE nodular basal cell carcinoma of right medial clavicle. Patient tolerated procedure well.           Vital Signs:  /71 (BP Location: Left arm,  Patient Position: Sitting, Cuff Size: adult)   Temp 98.2 °F (36.8 °C) (Oral)   Resp 16   Wt 85.9 kg (189 lb 6.4 oz)   SpO2 95%   BMI 28.80 kg/m²      Medications Reviewed:  Medications - Current[2]     Allergies Reviewed:  Allergies[3]     History:  Reviewed:  Past Medical History[4]   Reviewed:  Past Surgical History[5]   Reviewed Social History:  Social Hx on file[6]   Reviewed:  Family History[7]     Review of Systems:  Review of Systems   Constitutional:  Negative for activity change, appetite change, chills, fever and unexpected weight change.   HENT:  Negative for nosebleeds and trouble swallowing.    Eyes:  Negative for discharge and redness.   Respiratory:  Negative for cough, chest tightness and shortness of breath.    Cardiovascular:  Negative for chest pain and leg swelling.   Gastrointestinal:  Negative for abdominal distention, abdominal pain, blood in stool, nausea and vomiting.   Endocrine: Negative for polydipsia and polyphagia.   Genitourinary:  Negative for difficulty urinating, dysuria and hematuria.   Musculoskeletal:  Negative for myalgias.   Skin:  Negative for color change and pallor.   Allergic/Immunologic: Negative for immunocompromised state.   Neurological:  Negative for syncope, speech difficulty, weakness and numbness.   Hematological:  Does not bruise/bleed easily.   Psychiatric/Behavioral:  Negative for agitation and confusion.         Physical Examination:  Physical Exam  Constitutional:       General: He is not in acute distress.     Appearance: He is well-developed. He is not diaphoretic.   HENT:      Head: Normocephalic and atraumatic.   Eyes:      General: No scleral icterus.     Pupils: Pupils are equal, round, and reactive to light.   Neck:      Thyroid: No thyromegaly.   Cardiovascular:      Rate and Rhythm: Normal rate and regular rhythm.      Heart sounds: No murmur heard.  Pulmonary:      Effort: Pulmonary effort is normal. No respiratory distress.   Chest:       Comments: Right clavicle ICS: clean, dry, intact. No erythema or drainage. Healing nicely.   Abdominal:      General: There is no distension.      Palpations: Abdomen is soft.      Tenderness: There is no abdominal tenderness.   Musculoskeletal:         General: Normal range of motion.   Skin:     General: Skin is warm and dry.      Coloration: Skin is not jaundiced.   Neurological:      Mental Status: He is alert and oriented to person, place, and time.   Psychiatric:         Mood and Affect: Mood normal.         Behavior: Behavior normal. Behavior is cooperative.        Case Report     Surgical Pathology                                Case: [id number]                                     Authorizing Provider:  Jose Ozuna MD         Collected:           07/09/2025 01:35 PM             Ordering Location:     [facility]          Received:            07/09/2025 02:45 PM                                    Operating Room                                                                 Pathologist:           Deniz Castillo MD                                                                     Specimens:   A) - Chest, 2. 6 o'clock excision- frozen                                                              B) - Chest, 3. 12 o'clock excision- frozen                                                            C) - Chest, 1. Wide local excision right clavicle basal cell carcinoma, stitch marks                  12 o'clock superior                                                                            Final Diagnosis:        A. 6 o'clock; excision:  Fragment of benign subcutaneous tissue  Negative for basal cell carcinoma     B. 12 o'clock; excision:  Fragment of benign subcutaneous tissue  Negative for basal cell carcinoma     C. Right clavicle; wide local excision:  Basal cell carcinoma, nodular type  All excisional margins are negative for tumor        Electronically signed by Deniz Castillo MD on 7/10/2025 at 1556 CDT         Intraoperative consultation         A. 6 o'clock excision; FSA:  Negative for basal cell carcinoma.      B. 12 o'clock excision; FSB:  Negative for basal cell carcinoma.         Clinical Information      C44.91 Basal Cell Adenocarcinoma.        Gross Description      Specimen A is labeled \"Masonvski, 6 o'clock excision - frozen.\" It consists of a small fragment of skin measuring 0.6 x 0.2 x 0.2 cm. The presumed deep margin is inked black. The specimen is entirely frozen. The frozen section remnant is submitted in FSA.      Specimen B is labeled \"Sarahcevski, 12 o'clock excision - frozen.\" It consists of a small fragment of tissue measuring 0.1 x 0.1 x 0.1 cm. It is entirely submitted for frozen section analysis. The frozen section remnant is submitted in FSB.       The specimen frozen section diagnosis was conveyed to Dr. Ozuna as \"both part A and B are negative for basal cell carcinoma\".      Specimen C is labeled \"Veronica, wide local excision right clavicle basal cell carcinoma - stitch marks 12 o'clock superior.\" The specimen consists of a tan skin ellipse measuring 4.7 x 1.7 cm with an excision depth of 0.5 cm. A stitch marks 12 o'clock superior. The skin presents a central slightly raised red-tan lesion measuring 1.0 x 0.9 x 0.2 cm. This lesion is 0.2 cm from the 12 o'clock margin, 0.2 cm from the 6 o'clock margin, 1.7 cm from the 3 o'clock tip, 1.7 cm from the 9 o'clock tip. Margins inked as follows: 12 o'clock = blue, 6 o'clock = green, 3 o'clock = red, 9 o'clock = yellow, deep = black. The specimen is serially sectioned and submitted entirely as follows: C1, 3 o'clock and 9 o'clock tips; C2, entire lesion; C3-C4, remaining specimen. (jq)        Deniz Castillo M.D.     Interpretation     Neoplasm Abnormal        Case Report     Surgical Pathology                                Case: [id number]                                     Authorizing Provider:  Felix Luna MD       Collected:            06/12/2025 10:07 AM             Ordering Location:     [facility]    Received:            06/12/2025 10:07 AM                                    Merged with [facility]                                                                       Pathologist:           Viviana José MD                                                               Specimen:    Chest, R medial clavicle r/o BCC                                                              Final Diagnosis:     Skin, right medial clavicle, shave biopsy:  - Basal cell carcinoma, nodular type.  - The tumor involves the deep margin.     Electronically signed by Viviana Joés MD on 6/16/2025 at 1026 CDT        Clinical Information      D49.2 Neoplasm Of Unspecified Behavior Of Bone, Soft Tissue, And Skin.        Gross Description      A. Labeled with the patient's name, MRN, and \"right medial clavicle\"  Received in formalin: An unoriented soft, mottled, pink to gray-tan skin shave measuring 0.7 x 0.6 cm.  The specimen is inked blue along the resection margin and submitted entirely in A1.     (ZEKE)     Interpretation     Abnormal Abnormal            Assessment / Plan:  Basal cell carcinoma right clavicle  S/p wide local excision  Patient doing well post operatively  Recommend waiting additional 2 weeks before swimming  Scar management and sun screen education provided   Patient has follow up with dermatologist  May follow up on as needed basis  All patient questions answered       SOURAV Garcia  Department of Surgical Oncology  [facility]  [address], [place]  [id number] [place] [address]  [address] [place] [id number]  T: [phone]  F: [phone]          Follow Up:  No follow-ups on file.                [1]   Patient Active Problem List  Diagnosis    Macular degeneration    Age-related nuclear cataract of both eyes     Floater, vitreous, bilateral    Lumbar degenerative disc disease: moderate    History of TIA (transient ischemic attack)    Paroxysmal atrial fibrillation (HCC)    Platelets decreased   [2]   Current Outpatient Medications:     apixaban (ELIQUIS) 5 MG Oral Tab, Eliquis 5 mg tablet, [RxNorm: 8165832], Disp: , Rfl:     rosuvastatin 20 MG Oral Tab, Take 1 tablet (20 mg total) by mouth nightly., Disp: 90 tablet, Rfl: 3    metoprolol succinate ER 50 MG Oral Tablet 24 Hr, Take 1 tablet (50 mg total) by mouth daily., Disp: 90 tablet, Rfl: 3    lisinopril 20 MG Oral Tab, Take 1 tablet (20 mg total) by mouth daily., Disp: 90 tablet, Rfl: 3    folic acid 800 MCG Oral Tab, Take 1 tablet (800 mcg total) by mouth in the morning., Disp: , Rfl:     Multiple Vitamins-Minerals (ICAPS AREDS FORMULA) Oral Tab, Take 1 tablet by mouth in the morning and 1 tablet in the evening., Disp: , Rfl:     Vitamin B-12 500 MCG Oral Tab, Take 1 tablet (500 mcg total) by mouth daily., Disp: 90 tablet, Rfl: 3    HYDROcodone-acetaminophen 5-325 MG Oral Tab, Take 1-2 tablets by mouth every 6 (six) hours as needed. (Patient not taking: Reported on 7/24/2025), Disp: 10 tablet, Rfl: 0  [3] No Known Allergies  [4]   Past Medical History:   Age-related nuclear cataract of both eyes    Age-related nuclear cataract of both eyes    Age-related nuclear cataract of both eyes    Arrhythmia    Afib    BCC (basal cell carcinoma)    right medial clavicle    Cataract    Diet-controlled diabetes mellitus (HCC)    Epiretinal membrane    Floater, vitreous, bilateral    Floater, vitreous, bilateral    Floater, vitreous, bilateral    Floater, vitreous, bilateral    Floaters    High blood pressure    High cholesterol    Lumbar degenerative disc disease: moderate    Lumbar degenerative disc disease: moderate    Lumbar degenerative disc disease: moderate    Lumbar spondylosis: moderate    Lumbar spondylosis: moderate    Macular degeneration    Osteoarthritis    Other and  unspecified hyperlipidemia    Platelets decreased    Rheumatoid arthritis (HCC)    Stroke ([facility])    Mini stroke, 2015, left hand weakness    Type 2 diabetes mellitus without retinopathy ([facility])    Unspecified essential hypertension    Visual impairment    Visual impairment   [5]   Past Surgical History:  Procedure Laterality Date    Cataract      Repair incisional hernia,mariella  5/16/15    repair of incarcerated/strangulated R inguinal hernia with reduction of the incarcerated/strangulated lipoma of the preperitoneum and insertion of extra large and med PerFix plugs and mesh    Skin surgery  2010    L shoulder skin abscess I/D   [6]   Social History  Socioeconomic History    Marital status:     Number of children: 3   Occupational History    Occupation: research     Comment: retired   Tobacco Use    Smoking status: Never    Smokeless tobacco: Never   Vaping Use    Vaping status: Never Used   Substance and Sexual Activity    Alcohol use: Not Currently     Alcohol/week: 0.0 standard drinks of alcohol     Comment: RARELY    Drug use: No   Other Topics Concern    Caffeine Concern Yes     Comment: Coffee, 3 cups daily    Exercise Yes     Comment: walking 30 minutes daily.    Reaction to local anesthetic No    Pt has a pacemaker No    Pt has a defibrillator No   [7]   Family History  Problem Relation Age of Onset    Prostate Cancer Father     Cancer Mother         STOMACH    Dementia Brother     Glaucoma Neg     Macular degeneration Neg     Diabetes Neg

## (undated) DIAGNOSIS — E11.9 DIABETES MELLITUS TYPE 2 IN NONOBESE (HCC): Primary | ICD-10-CM

## (undated) DEVICE — SOLUTION IRRIG 1000ML 0.9% NACL USP BTL

## (undated) DEVICE — SUT PROL 2-0 30IN SH NABSRB BLU L26MM 1/2 CIR

## (undated) DEVICE — MINOR GENERAL: Brand: MEDLINE INDUSTRIES, INC.

## (undated) DEVICE — SHEET, T, LAPAROTOMY, STERILE: Brand: MEDLINE

## (undated) DEVICE — SUT PERMA- 2-0 30IN SH NABSRB BLK L26MM 1/

## (undated) DEVICE — ANTIBACTERIAL UNDYED BRAIDED (POLYGLACTIN 910), SYNTHETIC ABSORBABLE SUTURE: Brand: COATED VICRYL

## (undated) DEVICE — ADHESIVE LIQ 2/3ML VI MASTISOL

## (undated) DEVICE — SUT ETHLN 3-0 18IN PS-2 NABSRB BLK 19MM 3/8 C

## (undated) DEVICE — SUT PDS II 3-0 27IN SH ABSRB VLT L26MM 1/2

## (undated) DEVICE — TOWEL,OR,DSP,ST,BLUE,DLX,2/PK,40PK/CS: Brand: MEDLINE

## (undated) DEVICE — SHEET,DRAPE,53X77,STERILE: Brand: MEDLINE

## (undated) DEVICE — GAMMEX® PI HYBRID SIZE 6, STERILE POWDER-FREE SURGICAL GLOVE, POLYISOPRENE AND NEOPRENE BLEND: Brand: GAMMEX

## (undated) DEVICE — SUT VCRL 4-0 18IN ABSRB UD L13MM P-3 3/8

## (undated) DEVICE — CONTAINER,SPECIMEN,OR STERILE,4OZ: Brand: MEDLINE

## (undated) DEVICE — YANKAUER,FLEXIBLE HANDLE,REGLR CAPACITY: Brand: MEDLINE INDUSTRIES, INC.

## (undated) DEVICE — GLOVE SUR 7 SENSICARE PI PIP CRM PWD F

## (undated) DEVICE — SUT COAT VCRL + 0 54IN ABSRB UD ANTIBACT

## (undated) DEVICE — SUTURE ETHBND XL 2-0 30IN NABSRB GRN 26MM SH 1/2 CIR

## (undated) DEVICE — PAD,NON-ADHERENT,3X8,STERILE,LF,1/PK: Brand: CURAD

## (undated) NOTE — LETTER
12/18/19        Alis Bob  [address]  [facility]      Dear Millicent Osgood,    [address] [facility] records indicate that you have outstanding lab work and or testing that was ordered for you and has not yet been completed:  Orders Placed This Encounter

## (undated) NOTE — LETTER
October 25, 2018    Pedro Macdonald MD  [address] [facility]     Patient: Janie Sutton   YOB: 1935   Date of Visit: 10/25/2018       Dear Dr. Kami Bianchi MD:    Thank you for referring Janei Sutton to me for evaluati Medications:    Current Outpatient Medications: AmLODIPine Besylate 5 MG Oral Tab Take 1 tablet (5 mg total) by mouth once daily. Disp: 90 tablet Rfl: 1   lisinopril 20 MG Oral Tab Take 1 tablet (20 mg total) by mouth once daily.  Disp: 90 tablet Rfl: 1 Disc Good rim, Temporal crescent Good rim, Temporal crescent    C/D Ratio 0.3 0.3    Macula soft drusen- no BDR soft drusen- no BDR    Vessels Normal Normal    Periphery Normal Normal            Refraction     Wearing Rx       Sphere Cylinder Axis Add

## (undated) NOTE — LETTER
5/16/2019              Dmitri Sy        [facility] [address]         Dear Brinda Monk,      It was a pleasure to see you at our [place] [facility] [address] [place] [address] office.   Your lab tests were normal.  There is no nee

## (undated) NOTE — LETTER
1/30/2020    Patient: Janie Sutton   MR Number: [id number]   YOB: 1935   Date of Visit: 1/30/2020   Physician: Jazmin Issa MD     Dear Medicare Patient:  Jodee Torres TO BENEFICIARY:  Please know that while a refraction is i

## (undated) NOTE — MR AVS SNAPSHOT
[facility] [place] [address] [address] [phone]               Thank you for choosing us for your health care visit with Charley Leggett MD.  We are glad to serve you and happy to provide you with this summary of yo AmLODIPine Besylate 5 MG Tabs   Take 1 tablet (5 mg total) by mouth once daily. What changed:  See the new instructions. Commonly known as:  NORVASC           aspirin 81 MG Tbec   Take 1 tablet (81 mg total) by mouth daily.    Commonly known as:  Connor Dumont [email or web address]

## (undated) NOTE — Clinical Note
2/14/2017              Enrico Pinto        [address] [address]         Dear Devi Leigh,      It was a pleasure to see you at our [place] [facility] [address] [place] [address] office.   Your lab tests were normal.  There is no nee

## (undated) NOTE — LETTER
November 10, 2018     Diego Pi  [address]  [facility]      Dear Alen Yoderid:    Below are the results from your recent visit:    Resulted Orders   COMP METABOLIC PANEL (14)   Result Value Ref Range    Glucose 101 (H) 70 - 99 mg/dL Basophil Absolute 0.0 0.0 - 0.2 K/UL     The test results are normal. Please continue your current medication and plan. If you have any questions or concerns, please don't hesitate to call.            Abdiaziz Pinon MD

## (undated) NOTE — LETTER
11/7/2019              Mily Crowder [address]         Dear Latrice Dahl,      It was a pleasure to see you at our [place] [facility] [address] [place] [address] office.   Your lab tests were normal.  There is no nee

## (undated) NOTE — LETTER
January 30, 2020    Darlean Dance, MD  [address] [facility]     Patient: Alis Bob   YOB: 1935   Date of Visit: 1/30/2020       Dear Dr. Karina Page MD:    Thank you for referring Alis Bob to me for evaluatio Medications:  Current Outpatient Medications   Medication Sig Dispense Refill   • Lovastatin 20 MG Oral Tab TAKE ONE TABLET BY MOUTH DAILY WITH EVENING MEAL 90 tablet 1   • lisinopril 20 MG Oral Tab Take 1 tablet (20 mg total) by mouth once daily.  90 table Lens 2+ Nuclear sclerosis, 2+ Cortical cataract mainly nasal and inf 2+ Nuclear sclerosis, Nasal and inferior 2+ Cortical cataract    Vitreous Vitreous floaters Vitreous floaters          Fundus Exam       Right Left    Disc Good rim, Temporal crescent Go No prescriptions requested or ordered in this encounter        Follow up instructions:  Return in about 1 year (around 1/30/2021) for Diabetic eye exam.    1/30/2020  Scribed by: Patsie Nageotte, MD      If you have questions, please do not hesitate to ca